# Patient Record
Sex: MALE | Race: WHITE | NOT HISPANIC OR LATINO
[De-identification: names, ages, dates, MRNs, and addresses within clinical notes are randomized per-mention and may not be internally consistent; named-entity substitution may affect disease eponyms.]

---

## 2018-05-08 PROBLEM — Z00.00 ENCOUNTER FOR PREVENTIVE HEALTH EXAMINATION: Status: ACTIVE | Noted: 2018-05-08

## 2018-05-09 ENCOUNTER — APPOINTMENT (OUTPATIENT)
Dept: CARDIOLOGY | Facility: CLINIC | Age: 73
End: 2018-05-09
Payer: MEDICARE

## 2018-05-09 PROCEDURE — A9502: CPT

## 2018-05-09 PROCEDURE — 93015 CV STRESS TEST SUPVJ I&R: CPT

## 2018-05-09 PROCEDURE — 78452 HT MUSCLE IMAGE SPECT MULT: CPT

## 2018-06-01 ENCOUNTER — TRANSCRIPTION ENCOUNTER (OUTPATIENT)
Age: 73
End: 2018-06-01

## 2018-06-01 ENCOUNTER — OUTPATIENT (OUTPATIENT)
Dept: OUTPATIENT SERVICES | Facility: HOSPITAL | Age: 73
LOS: 1 days | End: 2018-06-01
Payer: MEDICARE

## 2018-06-01 VITALS
TEMPERATURE: 98 F | RESPIRATION RATE: 18 BRPM | HEIGHT: 68 IN | WEIGHT: 171.96 LBS | HEART RATE: 57 BPM | SYSTOLIC BLOOD PRESSURE: 152 MMHG | DIASTOLIC BLOOD PRESSURE: 83 MMHG | OXYGEN SATURATION: 99 %

## 2018-06-01 DIAGNOSIS — Z95.1 PRESENCE OF AORTOCORONARY BYPASS GRAFT: Chronic | ICD-10-CM

## 2018-06-01 DIAGNOSIS — R94.39 ABNORMAL RESULT OF OTHER CARDIOVASCULAR FUNCTION STUDY: ICD-10-CM

## 2018-06-01 LAB
ANION GAP SERPL CALC-SCNC: 11 MMOL/L — SIGNIFICANT CHANGE UP (ref 5–17)
BUN SERPL-MCNC: 15 MG/DL — SIGNIFICANT CHANGE UP (ref 7–23)
CALCIUM SERPL-MCNC: 9.4 MG/DL — SIGNIFICANT CHANGE UP (ref 8.4–10.5)
CHLORIDE SERPL-SCNC: 106 MMOL/L — SIGNIFICANT CHANGE UP (ref 96–108)
CO2 SERPL-SCNC: 26 MMOL/L — SIGNIFICANT CHANGE UP (ref 22–31)
CREAT SERPL-MCNC: 0.96 MG/DL — SIGNIFICANT CHANGE UP (ref 0.5–1.3)
GLUCOSE SERPL-MCNC: 105 MG/DL — HIGH (ref 70–99)
HCT VFR BLD CALC: 47.6 % — SIGNIFICANT CHANGE UP (ref 39–50)
HGB BLD-MCNC: 16.1 G/DL — SIGNIFICANT CHANGE UP (ref 13–17)
MCHC RBC-ENTMCNC: 31.4 PG — SIGNIFICANT CHANGE UP (ref 27–34)
MCHC RBC-ENTMCNC: 33.9 GM/DL — SIGNIFICANT CHANGE UP (ref 32–36)
MCV RBC AUTO: 92.5 FL — SIGNIFICANT CHANGE UP (ref 80–100)
PLATELET # BLD AUTO: 150 K/UL — SIGNIFICANT CHANGE UP (ref 150–400)
POTASSIUM SERPL-MCNC: 4 MMOL/L — SIGNIFICANT CHANGE UP (ref 3.5–5.3)
POTASSIUM SERPL-SCNC: 4 MMOL/L — SIGNIFICANT CHANGE UP (ref 3.5–5.3)
RBC # BLD: 5.14 M/UL — SIGNIFICANT CHANGE UP (ref 4.2–5.8)
RBC # FLD: 11.9 % — SIGNIFICANT CHANGE UP (ref 10.3–14.5)
SODIUM SERPL-SCNC: 143 MMOL/L — SIGNIFICANT CHANGE UP (ref 135–145)
WBC # BLD: 5 K/UL — SIGNIFICANT CHANGE UP (ref 3.8–10.5)
WBC # FLD AUTO: 5 K/UL — SIGNIFICANT CHANGE UP (ref 3.8–10.5)

## 2018-06-01 PROCEDURE — 93010 ELECTROCARDIOGRAM REPORT: CPT | Mod: 76

## 2018-06-01 RX ORDER — FOLIC ACID 0.8 MG
1 TABLET ORAL DAILY
Qty: 0 | Refills: 0 | Status: DISCONTINUED | OUTPATIENT
Start: 2018-06-01 | End: 2018-06-02

## 2018-06-01 RX ORDER — FINASTERIDE 5 MG/1
5 TABLET, FILM COATED ORAL DAILY
Qty: 0 | Refills: 0 | Status: DISCONTINUED | OUTPATIENT
Start: 2018-06-01 | End: 2018-06-02

## 2018-06-01 RX ORDER — AMLODIPINE BESYLATE 2.5 MG/1
10 TABLET ORAL DAILY
Qty: 0 | Refills: 0 | Status: DISCONTINUED | OUTPATIENT
Start: 2018-06-01 | End: 2018-06-02

## 2018-06-01 RX ORDER — ASPIRIN/CALCIUM CARB/MAGNESIUM 324 MG
81 TABLET ORAL DAILY
Qty: 0 | Refills: 0 | Status: DISCONTINUED | OUTPATIENT
Start: 2018-06-01 | End: 2018-06-02

## 2018-06-01 RX ORDER — LOSARTAN POTASSIUM 100 MG/1
50 TABLET, FILM COATED ORAL DAILY
Qty: 0 | Refills: 0 | Status: DISCONTINUED | OUTPATIENT
Start: 2018-06-01 | End: 2018-06-02

## 2018-06-01 RX ORDER — CLOPIDOGREL BISULFATE 75 MG/1
75 TABLET, FILM COATED ORAL DAILY
Qty: 0 | Refills: 0 | Status: DISCONTINUED | OUTPATIENT
Start: 2018-06-01 | End: 2018-06-02

## 2018-06-01 RX ORDER — CLOPIDOGREL BISULFATE 75 MG/1
1 TABLET, FILM COATED ORAL
Qty: 30 | Refills: 11
Start: 2018-06-01 | End: 2019-05-26

## 2018-06-01 RX ORDER — ATORVASTATIN CALCIUM 80 MG/1
40 TABLET, FILM COATED ORAL AT BEDTIME
Qty: 0 | Refills: 0 | Status: DISCONTINUED | OUTPATIENT
Start: 2018-06-01 | End: 2018-06-02

## 2018-06-01 RX ORDER — ATENOLOL 25 MG/1
50 TABLET ORAL DAILY
Qty: 0 | Refills: 0 | Status: DISCONTINUED | OUTPATIENT
Start: 2018-06-01 | End: 2018-06-02

## 2018-06-01 RX ADMIN — ATORVASTATIN CALCIUM 40 MILLIGRAM(S): 80 TABLET, FILM COATED ORAL at 21:13

## 2018-06-01 NOTE — H&P CARDIOLOGY - PMH
CAD (coronary artery disease)    HLD (hyperlipidemia)    HTN (hypertension)    Obstructive sleep apnea syndrome

## 2018-06-01 NOTE — DISCHARGE NOTE ADULT - CARE PLAN
Principal Discharge DX:	Coronary artery disease due to lipid rich plaque  Goal:	Pt remains chest pain free and understands post cath discharge instructions  Assessment and plan of treatment:	Do not MISS ANY DOSEs or STOP you Aspirin/Plavix, because these drugs helps to keep your sten open, unless instructed to do so by your cardiologist.   No heavy lifting, strenuous activity, bending, straining, or unnecessary stair climbing for 2 weeks. No driving for 2 days. You may shower 24 hours following the procedure but avoid baths/swimming for 1 week. Check your groin site for bleeding and/or swelling daily following procedure and call your doctor immediately if it occurs or if you experience increased pain at the site. Follow up with your cardiologist in 1-2 weeks. You may call [facility] if you have any questions/concerns regarding your procedure [phone].   Lifestyle modification: include healthy habits to prevent stroke and future CAD  Low salt, low fat diet.   Weight management.   Take medications as prescribed.    No smoking.  Follow up with your cardiologist or primary doctor in 1- 2 weeks.  Secondary Diagnosis:	Mixed hyperlipidemia  Goal:	Your LDL cholesterol will be less than 70mg/dL  Assessment and plan of treatment:	Continue with your cholesterol medications. Eat a heart healthy diet that is low in saturated fats and salt, and includes whole grains, fruits, vegetables and lean protein; exercise regularly (consult with your physician or cardiologist first); maintain a heart healthy weight; if you smoke - quit (A resource to help you stop smoking is the [facility] for [employer] Control – phone number [phone].). Continue to follow with your primary physician or cardiologist.  Secondary Diagnosis:	Essential hypertension  Goal:	Your blood pressure will be controlled.  Assessment and plan of treatment:	Continue with your blood pressure medications; eat a heart healthy diet with low salt diet; exercise regularly (consult with your physician or cardiologist first); maintain a heart healthy weight; if you smoke - quit (A resource to help you stop smoking is the [facility] for Tobacco Control – phone number [phone].); include healthy ways to manage stress. Continue to follow with your primary care physician or cardiologist.

## 2018-06-01 NOTE — DISCHARGE NOTE ADULT - PATIENT PORTAL LINK FT
You can access the [facility] Patient Portal, offered by [facility], by registering with the following website: [email or web address]

## 2018-06-01 NOTE — CHART NOTE - NSCHARTNOTEFT_GEN_A_CORE
Patient underwent a PCI procedure and is being admitted as they are at increased risk for major adverse cardiac and vascular events if discharged due to the following high risk characteristics:      Pre- Procedural Clinical Criteria  -Unstable angina     Admit- patient underwent a PCI procedure and is being admitted due to high risk characteristicts and is considered to be at an increased risk of major adverse cardiovascular events if discharged at this time   -pRCA and dRCA lesion  -bifurcation lesion, long lesion

## 2018-06-01 NOTE — DISCHARGE NOTE ADULT - HOSPITAL COURSE
72 year old  male with pmhx of Quintuple Bypass (1996), HTN, HLD, ALICIA on CPAP, and BPH presents for left heart catheterization. The patient states he performs he normal activities without any shortness of breath or chest pain. The patient states he walks on a treadmill three times a week with no symptoms. The patient went to his cardiologist, Dr. Edwards and underwent a nuclear stress test. The stress test was positive for 'small moderate defect in the inferolateral wall that was moderate in severity'. The patient currently denies chest pain, shortness of breath, dizziness, or orthopnea.    Pt s/p PCI: DAVID x2 to RCA via R groin. Pt tolerated procedure well and overnight remained uneventful. No c/o chest pain or SOB. Pt is hemodynamically stable, EKG and all lab results reviewed. Insertion/incision site benign, no bleeding or hematoma, and cath site dressing changed. Discharge teaching provided to Pt/caretaker and verbalized understanding the instruction. Pt is stable for discharge home as per attending.

## 2018-06-01 NOTE — H&P CARDIOLOGY - HISTORY OF PRESENT ILLNESS
72 year old  male with pmhx of Quintuple Bypass (1996), HTN, HLD, ALICIA on CPAP, and BPH presents for left heart catheterization. The patient states he performs he normal activities without any shortness of breath or chest pain. The patient states he walks on a treadmill three times a week with no symptoms. The patient went to his cardiologist, Dr. Edwards and underwent a nuclear stress test. The stress test was positive for 'small moderate defect in the inferolateral wall that was moderate in severity'. The patient currently denies chest pain, shortness of breath, dizziness, or orthopnea.

## 2018-06-01 NOTE — DISCHARGE NOTE ADULT - PLAN OF CARE
Pt remains chest pain free and understands post cath discharge instructions Do not MISS ANY DOSEs or STOP you Aspirin/Plavix, because these drugs helps to keep your sten open, unless instructed to do so by your cardiologist.   No heavy lifting, strenuous activity, bending, straining, or unnecessary stair climbing for 2 weeks. No driving for 2 days. You may shower 24 hours following the procedure but avoid baths/swimming for 1 week. Check your groin site for bleeding and/or swelling daily following procedure and call your doctor immediately if it occurs or if you experience increased pain at the site. Follow up with your cardiologist in 1-2 weeks. You may call [facility] if you have any questions/concerns regarding your procedure [phone].   Lifestyle modification: include healthy habits to prevent stroke and future CAD  Low salt, low fat diet.   Weight management.   Take medications as prescribed.    No smoking.  Follow up with your cardiologist or primary doctor in 1- 2 weeks. Your LDL cholesterol will be less than 70mg/dL Continue with your cholesterol medications. Eat a heart healthy diet that is low in saturated fats and salt, and includes whole grains, fruits, vegetables and lean protein; exercise regularly (consult with your physician or cardiologist first); maintain a heart healthy weight; if you smoke - quit (A resource to help you stop smoking is the [facility] for Tobacco Control – phone number [phone].). Continue to follow with your primary physician or cardiologist. Your blood pressure will be controlled. Continue with your blood pressure medications; eat a heart healthy diet with low salt diet; exercise regularly (consult with your physician or cardiologist first); maintain a heart healthy weight; if you smoke - quit (A resource to help you stop smoking is the [facility] for Tobacco Control – phone number [phone].); include healthy ways to manage stress. Continue to follow with your primary care physician or cardiologist.

## 2018-06-01 NOTE — DISCHARGE NOTE ADULT - CARE PROVIDER_API CALL
Yan De La Torre), Cardiology; Internal Medicine; Interventional Cardiology  [facility] Dept of Cardiology  [address] [place] [address]  Phone: [phone]  Fax: [phone]

## 2018-06-01 NOTE — DISCHARGE NOTE ADULT - MEDICATION SUMMARY - MEDICATIONS TO TAKE
I will START or STAY ON the medications listed below when I get home from the hospital:    finasteride 5 mg oral tablet  -- 1 tab(s) by mouth once a day  -- Indication: For prostate    aspirin 81 mg oral tablet  -- 1 tab(s) by mouth once a day  -- Indication: For CAD (coronary artery disease)    irbesartan 150 mg oral tablet  -- 1 tab(s) by mouth once a day  -- Indication: For HTN (hypertension)    Rapaflo 8 mg oral capsule  -- 1 cap(s) by mouth once a day  -- Indication: For prostate    rosuvastatin 5 mg oral tablet  -- 1 tab(s) by mouth once a day (at bedtime)  -- Indication: For HLD (hyperlipidemia)    clopidogrel 75 mg oral tablet  -- 1 tab(s) by mouth once a day MDD:1  -- Indication: For CAD (coronary artery disease)    atenolol 50 mg oral tablet  -- 1 tab(s) by mouth once a day  -- Indication: For HTN (hypertension)    amLODIPine 10 mg oral tablet  -- 1 tab(s) by mouth once a day  -- Indication: For HTN (hypertension)    folic acid 1 mg oral tablet  -- 1 tab(s) by mouth once a day  -- Indication: For Supplement

## 2018-06-01 NOTE — CHART NOTE - NSCHARTNOTEFT_GEN_A_CORE
Removal of Femoral Sheath    Pulses in the right/left lower extremity are palpable/audible by doppler/absent.   The patient was placed in the supine position. The insertion site was identified and the sutures were removed per protocol.    The _6___ Chinese femoral sheath was then removed.   Direct pressure was applied for  __20____ minutes.   Complications: None, VSS, Good Hemostasis.     Monitoring of the right/left groin and both lower extremities including neuro-vascular checks and vital signs every 15 minutes x 4, then every 30 minutes x 2, then every 1 hour was ordered.    Discharge Instruction discussed with patient: ASA, Plavix, statin, diet, activities, access site care, follow up care, reportable signs and symptoms.     A/P   72 year old  male with pmhx of Quintuple Bypass (1996), HTN, HLD, ALICIA on CPAP, and BPH presents for left heart catheterization. The patient states he performs he normal activities without any shortness of breath or chest pain. The patient states he walks on a treadmill three times a week with no symptoms. The patient went to his cardiologist, Dr. Edwards and underwent a nuclear stress test. The stress test was positive for 'small moderate defect in the inferolateral wall that was moderate in severity'. The patient currently denies chest pain, shortness of breath, dizziness, or orthopnea.     S/P PCI of pRCA(95%) X 1 DAVID and dist RCA (95%) X 1 DAVID    Plan: continue to monitor, Continue ASA, Plavix, Statin,   discharge home in am if stable.  Pt will follow up with his/her cardiologist in 1-2weeks.

## 2018-06-02 VITALS — OXYGEN SATURATION: 98 % | HEART RATE: 62 BPM

## 2018-06-02 LAB
ANION GAP SERPL CALC-SCNC: 11 MMOL/L — SIGNIFICANT CHANGE UP (ref 5–17)
BUN SERPL-MCNC: 19 MG/DL — SIGNIFICANT CHANGE UP (ref 7–23)
CALCIUM SERPL-MCNC: 8.8 MG/DL — SIGNIFICANT CHANGE UP (ref 8.4–10.5)
CHLORIDE SERPL-SCNC: 102 MMOL/L — SIGNIFICANT CHANGE UP (ref 96–108)
CO2 SERPL-SCNC: 26 MMOL/L — SIGNIFICANT CHANGE UP (ref 22–31)
CREAT SERPL-MCNC: 1.04 MG/DL — SIGNIFICANT CHANGE UP (ref 0.5–1.3)
GLUCOSE SERPL-MCNC: 97 MG/DL — SIGNIFICANT CHANGE UP (ref 70–99)
HCT VFR BLD CALC: 44.6 % — SIGNIFICANT CHANGE UP (ref 39–50)
HGB BLD-MCNC: 15.2 G/DL — SIGNIFICANT CHANGE UP (ref 13–17)
MCHC RBC-ENTMCNC: 31.6 PG — SIGNIFICANT CHANGE UP (ref 27–34)
MCHC RBC-ENTMCNC: 34.1 GM/DL — SIGNIFICANT CHANGE UP (ref 32–36)
MCV RBC AUTO: 92.7 FL — SIGNIFICANT CHANGE UP (ref 80–100)
PLATELET # BLD AUTO: 158 K/UL — SIGNIFICANT CHANGE UP (ref 150–400)
POTASSIUM SERPL-MCNC: 3.5 MMOL/L — SIGNIFICANT CHANGE UP (ref 3.5–5.3)
POTASSIUM SERPL-SCNC: 3.5 MMOL/L — SIGNIFICANT CHANGE UP (ref 3.5–5.3)
RBC # BLD: 4.81 M/UL — SIGNIFICANT CHANGE UP (ref 4.2–5.8)
RBC # FLD: 12 % — SIGNIFICANT CHANGE UP (ref 10.3–14.5)
SODIUM SERPL-SCNC: 139 MMOL/L — SIGNIFICANT CHANGE UP (ref 135–145)
WBC # BLD: 6.4 K/UL — SIGNIFICANT CHANGE UP (ref 3.8–10.5)
WBC # FLD AUTO: 6.4 K/UL — SIGNIFICANT CHANGE UP (ref 3.8–10.5)

## 2018-06-02 PROCEDURE — C1887: CPT

## 2018-06-02 PROCEDURE — 99153 MOD SED SAME PHYS/QHP EA: CPT

## 2018-06-02 PROCEDURE — 85027 COMPLETE CBC AUTOMATED: CPT

## 2018-06-02 PROCEDURE — 80048 BASIC METABOLIC PNL TOTAL CA: CPT

## 2018-06-02 PROCEDURE — C9600: CPT | Mod: RC

## 2018-06-02 PROCEDURE — 94660 CPAP INITIATION&MGMT: CPT

## 2018-06-02 PROCEDURE — 99152 MOD SED SAME PHYS/QHP 5/>YRS: CPT

## 2018-06-02 PROCEDURE — C1874: CPT

## 2018-06-02 PROCEDURE — 93454 CORONARY ARTERY ANGIO S&I: CPT | Mod: 59

## 2018-06-02 PROCEDURE — C1725: CPT

## 2018-06-02 PROCEDURE — C1894: CPT

## 2018-06-02 PROCEDURE — 93005 ELECTROCARDIOGRAM TRACING: CPT

## 2018-06-02 PROCEDURE — C1769: CPT

## 2018-06-02 RX ORDER — POTASSIUM CHLORIDE 20 MEQ
40 PACKET (EA) ORAL ONCE
Qty: 0 | Refills: 0 | Status: COMPLETED | OUTPATIENT
Start: 2018-06-02 | End: 2018-06-02

## 2018-06-02 RX ADMIN — Medication 40 MILLIEQUIVALENT(S): at 05:16

## 2018-06-02 RX ADMIN — CLOPIDOGREL BISULFATE 75 MILLIGRAM(S): 75 TABLET, FILM COATED ORAL at 05:16

## 2018-06-02 RX ADMIN — ATENOLOL 50 MILLIGRAM(S): 25 TABLET ORAL at 05:16

## 2018-06-02 RX ADMIN — AMLODIPINE BESYLATE 10 MILLIGRAM(S): 2.5 TABLET ORAL at 05:16

## 2018-06-02 RX ADMIN — LOSARTAN POTASSIUM 50 MILLIGRAM(S): 100 TABLET, FILM COATED ORAL at 05:16

## 2018-06-02 RX ADMIN — Medication 81 MILLIGRAM(S): at 05:16

## 2018-06-02 NOTE — PROGRESS NOTE ADULT - SUBJECTIVE AND OBJECTIVE BOX
Subjective/Objective:  Patient is a 72y old  Male who presents with a chief complaint of cardiac cath (2018 20:45)    Allergies    No Known Allergies    Intolerances      Medications:  amLODIPine   Tablet 10 milliGRAM(s) Oral daily  aspirin enteric coated 81 milliGRAM(s) Oral daily  ATENolol  Tablet 50 milliGRAM(s) Oral daily  atorvastatin 40 milliGRAM(s) Oral at bedtime  clopidogrel Tablet 75 milliGRAM(s) Oral daily  finasteride 5 milliGRAM(s) Oral daily  folic acid 1 milliGRAM(s) Oral daily  losartan 50 milliGRAM(s) Oral daily  potassium chloride    Tablet ER 40 milliEquivalent(s) Oral once      Review of Systems:   No c/o chest pain or SOB, and all others negative.    Vitals:  T(C): 36.6 (18 @ 20:00), Max: 36.8 (18 @ 11:27)  HR: 60 (18 @ 23:22) (57 - 72)  BP: 115/65 (18 @ 21:00) (114/65 - 152/83)  BP(mean): 106 (18 @ 11:27) (106 - 106)  RR: 17 (18 @ 21:00) (17 - 18)  SpO2: 97% (18 @ 23:22) (96% - 99%)  Wt(kg): --  Daily Height in cm: 172.72 (2018 11:27)    Daily Weight in k (2018 11:27)  I&O's Summary    2018 07:01  -  2018 04:04  --------------------------------------------------------  IN: 240 mL / OUT: 0 mL / NET: 240 mL      Physical Exam:  Appearance: Pt in NAD, non-toxic  Cardiovascular: S1 S2  Cath Site: No evidence of bleeding or hematoma, Non-tender to palpation, 2+ distal pulses  Respiratory: Clear to auscultation bilaterally  Gastrointestinal: Soft, NT/ND, Bowel Sounds +  Neurologic: Non-focal                          15.2   6.4   )-----------( 158      ( 2018 23:49 )             44.6     06-    139  |  102  |  19  ----------------------------<  97  3.5   |  26  |  1.04    Ca    8.8      2018 23:49        Lipid panel   Hgb A1c         Interpretation of Telemetry: SB/SR at HR 50-70's.  No special event over night.    Assessment/Plan:   S/P PCI: DAVID x2 to RCA via R groin. Pt tolerated procedure well and overnight remained uneventful. No c/o chest pain or SOB. Pt is hemodynamically stable, EKG and all lab results reviewed. Noted low K this morning and KCL supplemented. Insertion/incision site benign, no bleeding or hematoma, and cath site dressing changed. Discharge teaching provided to Pt/caretaker and verbalized understanding the instruction. Pt is stable for discharge home as per attending.   F/U with cardiologist in 1-2 weeks.  Plan to d/c home in Am if stable.

## 2019-05-23 PROBLEM — E78.5 HYPERLIPIDEMIA, UNSPECIFIED: Chronic | Status: ACTIVE | Noted: 2018-06-01

## 2019-05-23 PROBLEM — I10 ESSENTIAL (PRIMARY) HYPERTENSION: Chronic | Status: ACTIVE | Noted: 2018-06-01

## 2019-05-23 PROBLEM — G47.33 OBSTRUCTIVE SLEEP APNEA (ADULT) (PEDIATRIC): Chronic | Status: ACTIVE | Noted: 2018-06-01

## 2019-05-23 PROBLEM — I25.10 ATHEROSCLEROTIC HEART DISEASE OF NATIVE CORONARY ARTERY WITHOUT ANGINA PECTORIS: Chronic | Status: ACTIVE | Noted: 2018-06-01

## 2019-06-05 DIAGNOSIS — Z82.49 FAMILY HISTORY OF ISCHEMIC HEART DISEASE AND OTHER DISEASES OF THE CIRCULATORY SYSTEM: ICD-10-CM

## 2019-06-05 DIAGNOSIS — Z78.9 OTHER SPECIFIED HEALTH STATUS: ICD-10-CM

## 2019-06-05 RX ORDER — FOLIC ACID 1 MG/1
1 TABLET ORAL DAILY
Refills: 0 | Status: ACTIVE | COMMUNITY

## 2019-06-05 RX ORDER — FINASTERIDE 5 MG/1
5 TABLET, FILM COATED ORAL DAILY
Refills: 0 | Status: ACTIVE | COMMUNITY

## 2019-06-11 ENCOUNTER — APPOINTMENT (OUTPATIENT)
Dept: CARDIOLOGY | Facility: CLINIC | Age: 74
End: 2019-06-11
Payer: MEDICARE

## 2019-06-11 PROCEDURE — 78452 HT MUSCLE IMAGE SPECT MULT: CPT

## 2019-06-11 PROCEDURE — A9502: CPT

## 2019-06-11 PROCEDURE — 93015 CV STRESS TEST SUPVJ I&R: CPT

## 2020-12-23 ENCOUNTER — APPOINTMENT (OUTPATIENT)
Dept: CARDIOLOGY | Facility: CLINIC | Age: 75
End: 2020-12-23
Payer: MEDICARE

## 2020-12-23 PROCEDURE — 78452 HT MUSCLE IMAGE SPECT MULT: CPT

## 2020-12-23 PROCEDURE — A9502: CPT

## 2020-12-23 PROCEDURE — 93015 CV STRESS TEST SUPVJ I&R: CPT

## 2022-04-08 ENCOUNTER — APPOINTMENT (OUTPATIENT)
Dept: CARDIOLOGY | Facility: CLINIC | Age: 77
End: 2022-04-08
Payer: MEDICARE

## 2022-04-08 PROCEDURE — A9502: CPT

## 2022-04-08 PROCEDURE — 78452 HT MUSCLE IMAGE SPECT MULT: CPT

## 2022-04-08 PROCEDURE — 93015 CV STRESS TEST SUPVJ I&R: CPT

## 2022-04-22 ENCOUNTER — TRANSCRIPTION ENCOUNTER (OUTPATIENT)
Age: 77
End: 2022-04-22

## 2022-05-01 ENCOUNTER — RESULT CHARGE (OUTPATIENT)
Age: 77
End: 2022-05-01

## 2022-05-03 ENCOUNTER — APPOINTMENT (OUTPATIENT)
Dept: CARDIOLOGY | Facility: CLINIC | Age: 77
End: 2022-05-03
Payer: MEDICARE

## 2022-05-03 VITALS
BODY MASS INDEX: 27.15 KG/M2 | DIASTOLIC BLOOD PRESSURE: 69 MMHG | HEART RATE: 65 BPM | WEIGHT: 173 LBS | OXYGEN SATURATION: 96 % | HEIGHT: 67 IN | SYSTOLIC BLOOD PRESSURE: 137 MMHG

## 2022-05-03 PROCEDURE — 93000 ELECTROCARDIOGRAM COMPLETE: CPT

## 2022-05-03 PROCEDURE — 99205 OFFICE O/P NEW HI 60 MIN: CPT

## 2022-05-03 RX ORDER — IRBESARTAN 150 MG/1
150 TABLET ORAL DAILY
Refills: 0 | Status: COMPLETED | COMMUNITY
End: 2022-05-03

## 2022-05-03 NOTE — DISCUSSION/SUMMARY
[FreeTextEntry1] : \par Plan:\par 1. patient scheduled for left heart catheterization + angiogram - agree with r/a/c/i\par 2. continue current medications\par 3. will review and follow-up post-procedure

## 2022-05-03 NOTE — END OF VISIT
bandage. ¨ Apply more petroleum jelly and replace the bandage as needed. · Prop up the sore area on a pillow anytime you sit or lie down during the next 3 days. Try to keep it above the level of your heart. This will help reduce swelling. · Avoid any activity that could cause your cut to reopen. · Do not remove the stitches on your own. Your doctor will tell you when to come back to have the stitches removed. · Leave Steri-Strips on until they fall off. · Be safe with medicines. Read and follow all instructions on the label. ¨ If the doctor gave you a prescription medicine for pain, take it as prescribed. ¨ If you are not taking a prescription pain medicine, ask your doctor if you can take an over-the-counter medicine. When should you call for help? Call your doctor now or seek immediate medical care if:    · You have new pain, or your pain gets worse.     · The skin near the cut is cold or pale or changes color.     · You have tingling, weakness, or numbness near the cut.     · The cut starts to bleed, and blood soaks through the bandage. Oozing small amounts of blood is normal.     · You have trouble moving the area near the cut.     · You have symptoms of infection, such as:  ¨ Increased pain, swelling, warmth, or redness around the cut. ¨ Red streaks leading from the cut. ¨ Pus draining from the cut. ¨ A fever.    Watch closely for changes in your health, and be sure to contact your doctor if:    · The cut reopens.     · You do not get better as expected. Where can you learn more? Go to [email or web address] and sign in to your [employer] account. Enter R217 in the [facility] box to learn more about \"Cuts Closed With Stitches: Care Instructions. \"     If you do not have an account, please click on the \"Sign Up Now\" link. Current as of: November 20, 2017  Content Version: 11.7  © 0606-7896 [employer].  Care instructions adapted under license by [facility]
[Time Spent: ___ minutes] : I have spent [unfilled] minutes of time on the encounter.

## 2022-05-03 NOTE — HISTORY OF PRESENT ILLNESS
[FreeTextEntry1] : Mr Shi is a 76 year old man who presents for review of abnormal stress test. PMHx notable for previous CABG (LIMA-LAD, SVG-D1, SVG-RPDA). Cath 2018 revealed occluded SVG-RPDA and PCI was performed to native RCA (mid [place] 2.5, prox [place] 3.0). Recently reports CCS II symptoms with stress nuclear 4/8/22 reporting inferolateral ischemia. Symptomatic despite BB + CCB. Discussed conservative vs invasive management. Agreed should proceed with invasive angiogram.

## 2022-05-08 ENCOUNTER — NON-APPOINTMENT (OUTPATIENT)
Age: 77
End: 2022-05-08

## 2022-05-12 ENCOUNTER — TRANSCRIPTION ENCOUNTER (OUTPATIENT)
Age: 77
End: 2022-05-12

## 2022-05-12 ENCOUNTER — INPATIENT (INPATIENT)
Facility: HOSPITAL | Age: 77
LOS: 0 days | Discharge: HOME | DRG: 251 | End: 2022-05-13
Attending: INTERNAL MEDICINE | Admitting: INTERNAL MEDICINE
Payer: COMMERCIAL

## 2022-05-12 VITALS
HEART RATE: 68 BPM | SYSTOLIC BLOOD PRESSURE: 137 MMHG | WEIGHT: 171.96 LBS | DIASTOLIC BLOOD PRESSURE: 72 MMHG | OXYGEN SATURATION: 98 % | TEMPERATURE: 98 F | RESPIRATION RATE: 16 BRPM | HEIGHT: 67 IN

## 2022-05-12 DIAGNOSIS — Z95.1 PRESENCE OF AORTOCORONARY BYPASS GRAFT: Chronic | ICD-10-CM

## 2022-05-12 DIAGNOSIS — I25.9 CHRONIC ISCHEMIC HEART DISEASE, UNSPECIFIED: ICD-10-CM

## 2022-05-12 LAB
ALBUMIN SERPL ELPH-MCNC: 4.6 G/DL — SIGNIFICANT CHANGE UP (ref 3.3–5)
ALP SERPL-CCNC: 61 U/L — SIGNIFICANT CHANGE UP (ref 40–120)
ALT FLD-CCNC: 24 U/L — SIGNIFICANT CHANGE UP (ref 10–45)
ANION GAP SERPL CALC-SCNC: 10 MMOL/L — SIGNIFICANT CHANGE UP (ref 5–17)
AST SERPL-CCNC: 24 U/L — SIGNIFICANT CHANGE UP (ref 10–40)
BILIRUB SERPL-MCNC: 0.7 MG/DL — SIGNIFICANT CHANGE UP (ref 0.2–1.2)
BUN SERPL-MCNC: 14 MG/DL — SIGNIFICANT CHANGE UP (ref 7–23)
CALCIUM SERPL-MCNC: 9.1 MG/DL — SIGNIFICANT CHANGE UP (ref 8.4–10.5)
CHLORIDE SERPL-SCNC: 105 MMOL/L — SIGNIFICANT CHANGE UP (ref 96–108)
CO2 SERPL-SCNC: 27 MMOL/L — SIGNIFICANT CHANGE UP (ref 22–31)
CREAT SERPL-MCNC: 0.94 MG/DL — SIGNIFICANT CHANGE UP (ref 0.5–1.3)
EGFR: 83 ML/MIN/1.73M2 — SIGNIFICANT CHANGE UP
GLUCOSE SERPL-MCNC: 108 MG/DL — HIGH (ref 70–99)
HCT VFR BLD CALC: 43.4 % — SIGNIFICANT CHANGE UP (ref 39–50)
HGB BLD-MCNC: 14.8 G/DL — SIGNIFICANT CHANGE UP (ref 13–17)
MCHC RBC-ENTMCNC: 31.4 PG — SIGNIFICANT CHANGE UP (ref 27–34)
MCHC RBC-ENTMCNC: 34.1 GM/DL — SIGNIFICANT CHANGE UP (ref 32–36)
MCV RBC AUTO: 91.9 FL — SIGNIFICANT CHANGE UP (ref 80–100)
NRBC # BLD: 0 /100 WBCS — SIGNIFICANT CHANGE UP (ref 0–0)
PLATELET # BLD AUTO: 164 K/UL — SIGNIFICANT CHANGE UP (ref 150–400)
POTASSIUM SERPL-MCNC: 3.9 MMOL/L — SIGNIFICANT CHANGE UP (ref 3.5–5.3)
POTASSIUM SERPL-SCNC: 3.9 MMOL/L — SIGNIFICANT CHANGE UP (ref 3.5–5.3)
PROT SERPL-MCNC: 7.1 G/DL — SIGNIFICANT CHANGE UP (ref 6–8.3)
RBC # BLD: 4.72 M/UL — SIGNIFICANT CHANGE UP (ref 4.2–5.8)
RBC # FLD: 12.4 % — SIGNIFICANT CHANGE UP (ref 10.3–14.5)
SODIUM SERPL-SCNC: 142 MMOL/L — SIGNIFICANT CHANGE UP (ref 135–145)
TROPONIN T, HIGH SENSITIVITY RESULT: 9 NG/L — SIGNIFICANT CHANGE UP (ref 0–51)
WBC # BLD: 4.6 K/UL — SIGNIFICANT CHANGE UP (ref 3.8–10.5)
WBC # FLD AUTO: 4.6 K/UL — SIGNIFICANT CHANGE UP (ref 3.8–10.5)

## 2022-05-12 PROCEDURE — 93010 ELECTROCARDIOGRAM REPORT: CPT | Mod: 77

## 2022-05-12 PROCEDURE — 92920 PRQ TRLUML C ANGIOP 1ART&/BR: CPT | Mod: RC

## 2022-05-12 PROCEDURE — 93455 CORONARY ART/GRFT ANGIO S&I: CPT | Mod: 26,59

## 2022-05-12 PROCEDURE — 93010 ELECTROCARDIOGRAM REPORT: CPT

## 2022-05-12 PROCEDURE — 92978 ENDOLUMINL IVUS OCT C 1ST: CPT | Mod: 26,RC

## 2022-05-12 PROCEDURE — 99152 MOD SED SAME PHYS/QHP 5/>YRS: CPT

## 2022-05-12 RX ORDER — AMLODIPINE BESYLATE 2.5 MG/1
1 TABLET ORAL
Qty: 0 | Refills: 0 | DISCHARGE

## 2022-05-12 RX ORDER — ATORVASTATIN CALCIUM 80 MG/1
20 TABLET, FILM COATED ORAL AT BEDTIME
Refills: 0 | Status: DISCONTINUED | OUTPATIENT
Start: 2022-05-12 | End: 2022-05-13

## 2022-05-12 RX ORDER — ROSUVASTATIN CALCIUM 5 MG/1
1 TABLET ORAL
Qty: 0 | Refills: 0 | DISCHARGE

## 2022-05-12 RX ORDER — SILODOSIN 4 MG/1
1 CAPSULE ORAL
Qty: 0 | Refills: 0 | DISCHARGE

## 2022-05-12 RX ORDER — ASPIRIN/CALCIUM CARB/MAGNESIUM 324 MG
1 TABLET ORAL
Qty: 0 | Refills: 0 | DISCHARGE

## 2022-05-12 RX ORDER — FINASTERIDE 5 MG/1
1 TABLET, FILM COATED ORAL
Qty: 0 | Refills: 0 | DISCHARGE

## 2022-05-12 RX ORDER — ASPIRIN/CALCIUM CARB/MAGNESIUM 324 MG
81 TABLET ORAL DAILY
Refills: 0 | Status: DISCONTINUED | OUTPATIENT
Start: 2022-05-13 | End: 2022-05-13

## 2022-05-12 RX ORDER — CLOPIDOGREL BISULFATE 75 MG/1
1 TABLET, FILM COATED ORAL
Qty: 90 | Refills: 3
Start: 2022-05-12 | End: 2023-05-06

## 2022-05-12 RX ORDER — FINASTERIDE 5 MG/1
5 TABLET, FILM COATED ORAL DAILY
Refills: 0 | Status: DISCONTINUED | OUTPATIENT
Start: 2022-05-12 | End: 2022-05-13

## 2022-05-12 RX ORDER — ATENOLOL 25 MG/1
50 TABLET ORAL DAILY
Refills: 0 | Status: DISCONTINUED | OUTPATIENT
Start: 2022-05-12 | End: 2022-05-13

## 2022-05-12 RX ORDER — FOLIC ACID 0.8 MG
1 TABLET ORAL
Qty: 0 | Refills: 0 | DISCHARGE

## 2022-05-12 RX ORDER — CLOPIDOGREL BISULFATE 75 MG/1
75 TABLET, FILM COATED ORAL DAILY
Refills: 0 | Status: DISCONTINUED | OUTPATIENT
Start: 2022-05-13 | End: 2022-05-13

## 2022-05-12 RX ORDER — SODIUM CHLORIDE 9 MG/ML
1000 INJECTION INTRAMUSCULAR; INTRAVENOUS; SUBCUTANEOUS
Refills: 0 | Status: DISCONTINUED | OUTPATIENT
Start: 2022-05-12 | End: 2022-05-13

## 2022-05-12 RX ORDER — ATENOLOL 25 MG/1
1 TABLET ORAL
Qty: 0 | Refills: 0 | DISCHARGE

## 2022-05-12 RX ORDER — IRBESARTAN 75 MG/1
1 TABLET ORAL
Qty: 0 | Refills: 0 | DISCHARGE

## 2022-05-12 RX ORDER — ASPIRIN/CALCIUM CARB/MAGNESIUM 324 MG
1 TABLET ORAL
Qty: 90 | Refills: 3
Start: 2022-05-12 | End: 2023-05-06

## 2022-05-12 RX ORDER — AMLODIPINE BESYLATE 2.5 MG/1
10 TABLET ORAL DAILY
Refills: 0 | Status: DISCONTINUED | OUTPATIENT
Start: 2022-05-12 | End: 2022-05-13

## 2022-05-12 RX ADMIN — SODIUM CHLORIDE 150 MILLILITER(S): 9 INJECTION INTRAMUSCULAR; INTRAVENOUS; SUBCUTANEOUS at 12:46

## 2022-05-12 RX ADMIN — SODIUM CHLORIDE 75 MILLILITER(S): 9 INJECTION INTRAMUSCULAR; INTRAVENOUS; SUBCUTANEOUS at 09:36

## 2022-05-12 RX ADMIN — ATORVASTATIN CALCIUM 20 MILLIGRAM(S): 80 TABLET, FILM COATED ORAL at 21:24

## 2022-05-12 NOTE — ASU DISCHARGE PLAN (ADULT/PEDIATRIC) - C. MAKE IMPORTANT PERSONAL OR BUSINESS DECISIONS
Patient comes to clinic for follow up anticoagulation visit.  Last INR on 5/13/21 was 3.2.  Dose maintained.   Today's INR is 3.4 and is above goal range.    Current warfarin total weekly dose of 65 mg verified.  Informed the INR result is above therapeutic range and instructed to decrease current dose per protocol. Discussed dose and return date of 6/10/21 for next INR. See Anticoagulation flowsheet.    INR today is 3.4, above range and up from previous 3.2, in 2 weeks on dose of 70 mg/week. Prior stable dose was 80mg/wk. Denies changes in meds/diet/health, feels fine. Patient would like to eat some salads. Instructed pt to decrease dose to 65 mg/week.  Recheck INR in 2 week at [facility].    Chiquita Benjamin NP is in the office today supervising the treatment.    Call your physician or seek medical care immediately if you notice any of the following symptoms of a bleed:   · Red, dark, coffee or cola colored urine  · Red or tar like stools  · Excessive bleeding from gums or nose  · Vomiting coffee colored or bright red material  · Coughing up red tinged sputum  · Severe or unprovoked pain (ex: severe Headache or Abdominal pain)  · Sudden, spontaneous bruising for no reason  · For female patients:  Excessive menstrual bleeding  · A cut that will not stop bleeding within 10-15 mins  · Symptoms associated with abnormal bleeding/high INR reviewed.    Encouraged to avoid activities that may result in a serious fall or injury and verbalizes understanding.    Instructed to contact the clinic with any unusual bleeding or bruising, any changes in medications, diet, health status, lifestyle, or any other changes, questions or concerns. Verbalized understanding of all discussed.   
Statement Selected

## 2022-05-12 NOTE — H&P CARDIOLOGY - NSICDXPASTMEDICALHX_GEN_ALL_CORE_FT
PAST MEDICAL HISTORY:  CAD (coronary artery disease)     History of BPH     HLD (hyperlipidemia)     HTN (hypertension)     Obstructive sleep apnea syndrome

## 2022-05-12 NOTE — ASU DISCHARGE PLAN (ADULT/PEDIATRIC) - CARE PROVIDER_API CALL
Yan De La Torre)  Cardiology; Internal Medicine; Interventional Cardiology  [facility]- Dept of Cardiology, [address] [place] [address]  Phone: [phone]  Fax: [phone]  Established Patient  Follow Up Time: 2 weeks

## 2022-05-12 NOTE — ASU DISCHARGE PLAN (ADULT/PEDIATRIC) - NS MD DC FALL RISK RISK
For information on Fall & Injury Prevention, visit: [email or web address] OR  [email or web address] OR  https://www.cdc.gov/steadi/patient.html

## 2022-05-12 NOTE — H&P CARDIOLOGY - HISTORY OF PRESENT ILLNESS
This is a 76 year old  male with PMH of CAD, CABG 5v (1996 LIMA-LAD, SVG-D1, SVG-RPDA), with stent placement to native prox/mid RCA 2018 for occluded SVG-RPDA, HTN, HLD, ALICIA on CPAP7, and BPH who presented to cardiology, Dr. De La Torre, for evaluation of abnormal stress test 4/8/22 (reporting inferolateral ischemia) with recent reports occasional episodes of chest discomfort (CCS II symptoms).  Denies SOB/CHRISTIAN, dizziness, diaphoresis, palpitations, nausea, vomiting, peripheral edema, recent weight gain, or syncope. Pt. presents for further evaluation and cardiac cath. No implanted monitoring devices.

## 2022-05-12 NOTE — ASU DISCHARGE PLAN (ADULT/PEDIATRIC) - ASU DC SPECIAL INSTRUCTIONSFT

## 2022-05-13 ENCOUNTER — TRANSCRIPTION ENCOUNTER (OUTPATIENT)
Age: 77
End: 2022-05-13

## 2022-05-13 VITALS
SYSTOLIC BLOOD PRESSURE: 105 MMHG | HEART RATE: 79 BPM | DIASTOLIC BLOOD PRESSURE: 55 MMHG | OXYGEN SATURATION: 94 % | TEMPERATURE: 98 F | RESPIRATION RATE: 17 BRPM

## 2022-05-13 PROCEDURE — C1753: CPT

## 2022-05-13 PROCEDURE — 84484 ASSAY OF TROPONIN QUANT: CPT

## 2022-05-13 PROCEDURE — C1761: CPT

## 2022-05-13 PROCEDURE — C1769: CPT

## 2022-05-13 PROCEDURE — 99152 MOD SED SAME PHYS/QHP 5/>YRS: CPT

## 2022-05-13 PROCEDURE — C1894: CPT

## 2022-05-13 PROCEDURE — 93005 ELECTROCARDIOGRAM TRACING: CPT

## 2022-05-13 PROCEDURE — 99153 MOD SED SAME PHYS/QHP EA: CPT

## 2022-05-13 PROCEDURE — C1725: CPT

## 2022-05-13 PROCEDURE — 93455 CORONARY ART/GRFT ANGIO S&I: CPT | Mod: 59

## 2022-05-13 PROCEDURE — 85027 COMPLETE CBC AUTOMATED: CPT

## 2022-05-13 PROCEDURE — 92978 ENDOLUMINL IVUS OCT C 1ST: CPT

## 2022-05-13 PROCEDURE — 92920 PRQ TRLUML C ANGIOP 1ART&/BR: CPT

## 2022-05-13 PROCEDURE — C1887: CPT

## 2022-05-13 PROCEDURE — 80053 COMPREHEN METABOLIC PANEL: CPT

## 2022-05-13 PROCEDURE — 93799 UNLISTED CV SVC/PROCEDURE: CPT

## 2022-05-13 PROCEDURE — 93454 CORONARY ARTERY ANGIO S&I: CPT

## 2022-05-13 RX ADMIN — CLOPIDOGREL BISULFATE 75 MILLIGRAM(S): 75 TABLET, FILM COATED ORAL at 06:48

## 2022-05-13 RX ADMIN — Medication 81 MILLIGRAM(S): at 06:48

## 2022-05-13 RX ADMIN — ATENOLOL 50 MILLIGRAM(S): 25 TABLET ORAL at 06:49

## 2022-05-13 RX ADMIN — AMLODIPINE BESYLATE 10 MILLIGRAM(S): 2.5 TABLET ORAL at 06:48

## 2022-05-13 NOTE — DISCHARGE NOTE PROVIDER - HOSPITAL COURSE
HPI:  This is a 76 year old  male with PMH of CAD, CABG 5v (1996 LIMA-LAD, SVG-D1, SVG-RPDA), with stent placement to native prox/mid RCA 2018 for occluded SVG-RPDA, HTN, HLD, ALICIA on CPAP7, and BPH who presented to cardiology, Dr. De La Torre, for evaluation of abnormal stress test 4/8/22 (reporting inferolateral ischemia) with recent reports occasional episodes of chest discomfort (CCS II symptoms).  Denies SOB/CHRISTIAN, dizziness, diaphoresis, palpitations, nausea, vomiting, peripheral edema, recent weight gain, or syncope. Pt. presents for further evaluation and cardiac cath. No implanted monitoring devices.  (12 May 2022 08:11).    5/12 s/p shock waves to RCA. Right femoral access site without swelling, bleeding.

## 2022-05-13 NOTE — DISCHARGE NOTE PROVIDER - NSDCCPCAREPLAN_GEN_ALL_CORE_FT
PRINCIPAL DISCHARGE DIAGNOSIS  Diagnosis: CAD (coronary artery disease)  Assessment and Plan of Treatment: Do not stop your aspirin or Plavix unless instructed to do so by your cardiologist, they help keep your stented arteries open.   No heavy lifting, strenuous activity, bending, straining, or unnecessary stair climbing for 2 weeks. No driving for 2 days. You may shower 24 hours following the procedure but avoid baths/swimming for 1 week. Check your groin site for bleeding and/or swelling daily following procedure and call your doctor immediately if it occurs or if you experience increased pain at the site. Follow up with your cardiologist in 1-2 weeks. You may call [facility] if you have any questions/concerns regarding your procedure [phone].      SECONDARY DISCHARGE DIAGNOSES  Diagnosis: HTN (hypertension)  Assessment and Plan of Treatment: Continue with your blood pressure medications; eat a heart healthy diet with low salt diet; exercise regularly (consult with your physician or cardiologist first); maintain a heart healthy weight; if you smoke - quit (A resource to help you stop smoking is the [facility] [employer] Control – phone number [phone].); include healthy ways to manage stress. Continue to follow with your primary care physician or cardiologist.    Diagnosis: HLD (hyperlipidemia)  Assessment and Plan of Treatment: Continue with your cholesterol medications. Eat a heart healthy diet that is low in saturated fats and salt, and includes whole grains, fruits, vegetables and lean protein; exercise regularly (consult with your physician or cardiologist first); maintain a heart healthy weight; if you smoke - quit (A resource to help you stop smoking is the [facility] [employer] for Tobacco Control – phone number [phone].). Continue to follow with your primary physician or cardiologist.

## 2022-05-13 NOTE — DISCHARGE NOTE PROVIDER - NSDCCPTREATMENT_GEN_ALL_CORE_FT
PRINCIPAL PROCEDURE  Procedure: Lithotripsy, coronary artery  Findings and Treatment: shock wave to RCA

## 2022-05-13 NOTE — DISCHARGE NOTE PROVIDER - CARE PROVIDER_API CALL
Yan De La Torre)  Cardiology; Internal Medicine; Interventional Cardiology  [facility]- Dept of Cardiology, [address] [place] [address]  Phone: [phone]  Fax: [phone]  Follow Up Time: 2 weeks

## 2022-05-13 NOTE — DISCHARGE NOTE NURSING/CASE MANAGEMENT/SOCIAL WORK - PATIENT PORTAL LINK FT
You can access the FollowMyHealth Patient Portal offered by [facility] by registering at the following website: [email or web address]. By joining [employer]’s FollowMyHealth portal, you will also be able to view your health information using other applications (apps) compatible with our system.

## 2022-05-13 NOTE — DISCHARGE NOTE NURSING/CASE MANAGEMENT/SOCIAL WORK - NSDCPEFALRISK_GEN_ALL_CORE
For information on Fall & Injury Prevention, visit: [email or web address] OR  [email or web address] OR  https://www.cdc.gov/steadi/patient.html

## 2022-05-13 NOTE — DISCHARGE NOTE PROVIDER - NSDCPNSUBOBJ_GEN_ALL_CORE
Patient is a 76y old  Male who presents with a chief complaint of         Allergies    No Known Allergies    Intolerances        Medications:  amLODIPine   Tablet 10 milliGRAM(s) Oral daily  aspirin enteric coated 81 milliGRAM(s) Oral daily  ATENolol  Tablet 50 milliGRAM(s) Oral daily  atorvastatin 20 milliGRAM(s) Oral at bedtime  clopidogrel Tablet 75 milliGRAM(s) Oral daily  finasteride 5 milliGRAM(s) Oral daily  sodium chloride 0.9%. 1000 milliLiter(s) IV Continuous <Continuous>  sodium chloride 0.9%. 1000 milliLiter(s) IV Continuous <Continuous>      Vitals:  T(C): 36.4 (22 @ 11:35), Max: 36.5 (22 @ 07:43)  HR: 79 (22 @ 19:00) (65 - 85)  BP: 110/60 (22 @ 18:40) (93/59 - 137/72)  BP(mean): 72 (22 @ 18:40) (63 - 93)  RR: 17 (22 @ 18:40) (16 - 18)  SpO2: 98% (22 @ 18:40) (93% - 98%)  Wt(kg): --  Daily Height in cm: 170.18 (12 May 2022 08:11)    Daily Weight in k (12 May 2022 08:11)  I&O's Summary    12 May 2022 07:01  -  13 May 2022 04:42  --------------------------------------------------------  IN: 75 mL / OUT: 0 mL / NET: 75 mL          Physical Exam:  Appearance: Normal  Eyes: PERRL, EOMI  HENT: Normal oral [place], NC/AT  Cardiovascular: S1S2, RRR, No M/R/G, no JVD, No Lower extremity edema  Procedural Access Site: No hematoma, Non-tender to palpation, 2+ pulse, No bruit, No Ecchymosis  Respiratory: Clear to auscultation bilaterally  Gastrointestinal: Soft, Non tender, Normal Bowel Sounds  Musculoskeletal: No clubbing, No joint deformity   Neurologic: Non-focal  Lymphatic: No lymphadenopathy  Psychiatry: AAOx3, Mood & affect appropriate  Skin: No rashes, No ecchymoses, No cyanosis        142  |  105  |  14  ----------------------------<  108<H>  3.9   |  27  |  0.94    Ca    9.1      12 May 2022 09:00    TPro  7.1  /  Alb  4.6  /  TBili  0.7  /  DBili  x   /  AST  24  /  ALT  24  /  AlkPhos  61              Lipid panel   Hgb A1c                         14.8   4.60  )-----------( 164      ( 12 May 2022 09:00 )             43.4         ECG: SR 68 bpm    CAD: Monitor groin site for swelling, bleeding  Continue ASA, Plavix     HTN: Continue antihypertensive medications with hold parameters     HLD: continue statin, confirm lipid panel results     Imaging:    Interpretation of Telemetry:

## 2022-05-16 PROBLEM — Z87.438 PERSONAL HISTORY OF OTHER DISEASES OF MALE GENITAL ORGANS: Chronic | Status: ACTIVE | Noted: 2022-05-12

## 2022-05-31 ENCOUNTER — APPOINTMENT (OUTPATIENT)
Dept: CARDIOLOGY | Facility: CLINIC | Age: 77
End: 2022-05-31
Payer: MEDICARE

## 2022-05-31 VITALS
SYSTOLIC BLOOD PRESSURE: 135 MMHG | HEART RATE: 60 BPM | BODY MASS INDEX: 27 KG/M2 | HEIGHT: 67 IN | WEIGHT: 172 LBS | OXYGEN SATURATION: 97 % | DIASTOLIC BLOOD PRESSURE: 76 MMHG

## 2022-05-31 PROCEDURE — 99214 OFFICE O/P EST MOD 30 MIN: CPT

## 2022-05-31 PROCEDURE — 93000 ELECTROCARDIOGRAM COMPLETE: CPT

## 2022-05-31 NOTE — DISCUSSION/SUMMARY
[FreeTextEntry1] : \par Plan:\par 1. Continue current medications\par 2. Patient to follow-up with usual cardiologist\par 3. Happy to review as required.

## 2022-05-31 NOTE — HISTORY OF PRESENT ILLNESS
[FreeTextEntry1] : Mr Shi is a 76 year old man who presents for review of abnormal stress test. PMHx notable for previous CABG (LIMA-LAD, SVG-D1, SVG-RPDA). Cath 2018 revealed occluded SVG-RPDA and PCI was performed to native RCA (mid [place] 2.5, prox [place] 3.0). Recently reports CCS II symptoms with stress nuclear 4/8/22 reporting inferolateral ischemia. Symptomatic despite BB + CCB. Discussed conservative vs invasive management. Agreed should proceed with invasive angiogram. \par \par Follow-up 5/31/22 - cath May 2022 Patent LIMA-LAD and SVG-D1. Native RCA severe ostial ISR (previous\par stent protruding into aorta). This was treated with 3.5mm Shockwave IVL + 4.0mm NC POBA under IVUS guidance. Patient feeling well post-procedure with significant resolution of symptoms. \par

## 2023-01-17 ENCOUNTER — NON-APPOINTMENT (OUTPATIENT)
Age: 78
End: 2023-01-17

## 2023-01-17 ENCOUNTER — APPOINTMENT (OUTPATIENT)
Dept: CARDIOLOGY | Facility: CLINIC | Age: 78
End: 2023-01-17
Payer: MEDICARE

## 2023-01-17 VITALS
HEIGHT: 67 IN | DIASTOLIC BLOOD PRESSURE: 80 MMHG | OXYGEN SATURATION: 97 % | BODY MASS INDEX: 28.09 KG/M2 | SYSTOLIC BLOOD PRESSURE: 112 MMHG | WEIGHT: 179 LBS | HEART RATE: 62 BPM

## 2023-01-17 VITALS — SYSTOLIC BLOOD PRESSURE: 116 MMHG | DIASTOLIC BLOOD PRESSURE: 80 MMHG

## 2023-01-17 DIAGNOSIS — Z80.3 FAMILY HISTORY OF MALIGNANT NEOPLASM OF BREAST: ICD-10-CM

## 2023-01-17 DIAGNOSIS — Z80.0 FAMILY HISTORY OF MALIGNANT NEOPLASM OF DIGESTIVE ORGANS: ICD-10-CM

## 2023-01-17 DIAGNOSIS — R25.2 CRAMP AND SPASM: ICD-10-CM

## 2023-01-17 PROCEDURE — 93000 ELECTROCARDIOGRAM COMPLETE: CPT

## 2023-01-17 PROCEDURE — 99204 OFFICE O/P NEW MOD 45 MIN: CPT

## 2023-01-17 RX ORDER — CALCIUM CARBONATE 300MG(750)
1000 TABLET,CHEWABLE ORAL
Refills: 0 | Status: ACTIVE | COMMUNITY

## 2023-01-17 RX ORDER — TELMISARTAN 40 MG/1
40 TABLET ORAL
Qty: 90 | Refills: 2 | Status: DISCONTINUED | COMMUNITY
End: 2023-01-17

## 2023-01-18 NOTE — HISTORY OF PRESENT ILLNESS
[FreeTextEntry1] : ROSENDA GATES  is a 77 year old  M\par \par \par \par \par Prior cardiovascular care in [place]. \par \par Initially underwent bypass surgery in 1996 at the age of 51 at [facility] (LIMA-LAD, SVG-D1, SVG-RPDA).  \par Underwent a repeat catheterization with 2 stents 4 years ago DAVID RCA. \par Last cardiac patent LIMA to LAD vein graft to diagonal native RCA severe ostial in-stent restenosis underwent shockwave lithotripsy and POBA under IVUS guidance \par History of hypertension and mixed dyslipidemia with low HDL, BPH\par There is no prior history of a clinical myocardial infarction \par There is no history of symptomatic congestive heart failure rheumatic heart disease or valvular disease.\par There is no history of symptomatic arrhythmias including atrial fibrillation.\par \par There is chronic atypical chest discomfort.  \par There is lightheadedness with change in position. \par There is no exertional chest pain, pressure or discomfort. \par There is no significant dyspnea on exertion or orthopnea. \par There are no symptomatic palpitations\par \par Retired CPA.  \par Now lives in the [place].  \par \par cath left main 80% stenosis LAD occluded left circumflex mild atherosclerosis proximal RCA 85% in-stent restenosis patent vein graft to diagonal patent LIMA to LAD report has been reviewed\par \par Today's EKG demonstrates normal sinus rhythm with a first-degree AV block and right bundle branch block.  \par Last blood work September 2022 hemoglobin 15.4 creatinine 0.9 total cholesterol 130 LDL 70 \par last stress test April 2022 Lexiscan inferior and lateral defects normal EF \par \par

## 2023-01-18 NOTE — HISTORY OF PRESENT ILLNESS
[FreeTextEntry1] : ROSENDA GATES  is a 77 year old  M\par \par \par \par \par Prior cardiovascular care in [place]. \par \par Initially underwent bypass surgery in 1996 at the age of 51 at [facility] (LIMA-LAD, SVG-D1, SVG-RPDA).  \par Underwent a repeat catheterization with 2 stents 4 years ago DAVID RCA. \par Last cardiac patent LIMA to LAD vein graft to diagonal native RCA severe ostial in-stent restenosis underwent shockwave lithotripsy and POBA under IVUS guidance \par History of hypertension and mixed dyslipidemia with low HDL, BPH\par There is no prior history of a clinical myocardial infarction \par There is no history of symptomatic congestive heart failure rheumatic heart disease or valvular disease.\par There is no history of symptomatic arrhythmias including atrial fibrillation.\par \par There is chronic atypical chest discomfort.  \par There is lightheadedness with change in position. \par There is no exertional chest pain, pressure or discomfort. \par There is no significant dyspnea on exertion or orthopnea. \par There are no symptomatic palpitations\par \par Retired CPA.  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  \par \par cath left main 80% stenosis LAD occluded left circumflex mild atherosclerosis proximal RCA 85% in-stent restenosis patent vein graft to diagonal patent LIMA to LAD report has been reviewed\par \par Today's EKG demonstrates normal sinus rhythm with a first-degree AV block and right bundle branch block.  \par Last blood work September 2022 hemoglobin 15.4 creatinine 0.9 total cholesterol 130 LDL 70 \par last stress test April 2022 Lexiscan inferior and lateral defects normal EF \par \par

## 2023-01-18 NOTE — ASSESSMENT
[FreeTextEntry1] : Follow-up ultrasound imaging.  \par Readdress ischemic evaluation at clinical follow-up.  \par \par Continue antiplatelet therapy.  Reviewed bleeding precautions.\par Increase statin therapy.  \par Continue antihypertensive therapy including beta-blocker and calcium channel blocker.  \par Adjunctive dietary measures.  Low-sodium heart healthy diet.  Aerobic exercise.  \par Follow-up blood work.  \par Instructed to call if adverse effect\par

## 2023-02-02 ENCOUNTER — APPOINTMENT (OUTPATIENT)
Dept: CARDIOLOGY | Facility: CLINIC | Age: 78
End: 2023-02-02
Payer: MEDICARE

## 2023-02-02 PROCEDURE — 93306 TTE W/DOPPLER COMPLETE: CPT

## 2023-02-02 PROCEDURE — 93880 EXTRACRANIAL BILAT STUDY: CPT

## 2023-02-02 PROCEDURE — 93979 VASCULAR STUDY: CPT

## 2023-02-08 ENCOUNTER — APPOINTMENT (OUTPATIENT)
Dept: CARDIOLOGY | Facility: CLINIC | Age: 78
End: 2023-02-08
Payer: MEDICARE

## 2023-02-08 VITALS
TEMPERATURE: 98.4 F | SYSTOLIC BLOOD PRESSURE: 118 MMHG | DIASTOLIC BLOOD PRESSURE: 66 MMHG | WEIGHT: 175 LBS | HEIGHT: 67 IN | OXYGEN SATURATION: 98 % | BODY MASS INDEX: 27.47 KG/M2 | HEART RATE: 71 BPM

## 2023-02-08 PROCEDURE — 99214 OFFICE O/P EST MOD 30 MIN: CPT

## 2023-02-08 RX ORDER — SILODOSIN 8 MG/1
8 CAPSULE ORAL DAILY
Qty: 90 | Refills: 0 | Status: ACTIVE | COMMUNITY
Start: 1900-01-01 | End: 1900-01-01

## 2023-02-11 NOTE — HISTORY OF PRESENT ILLNESS
[FreeTextEntry1] : ROSENDA GATES  is a 77 year old  M\par \par Prior cardiovascular care in [place]. \par \par Initially underwent bypass surgery in 1996 at the age of 51 at [facility] (LIMA-LAD, SVG-D1, SVG-RPDA).  \par Underwent a repeat catheterization with 2 stents 4 years ago DAVID RCA. \par Last cardiac patent LIMA to LAD vein graft to diagonal native RCA severe ostial in-stent restenosis underwent shockwave lithotripsy and POBA under IVUS guidance \par History of hypertension and mixed dyslipidemia with low HDL, BPH\par There is no prior history of a clinical myocardial infarction \par There is no history of symptomatic congestive heart failure rheumatic heart disease.\par There is no history of symptomatic arrhythmias including atrial fibrillation.\par \par He is active at the gym over an hour.  \par There is minor bruising on antiplatelet therapy. \par There is chronic atypical chest discomfort.  \par There is lightheadedness with change in position. \par There is no exertional chest pain, pressure or discomfort. \par There is no significant dyspnea on exertion or orthopnea. \par There are no symptomatic palpitations\par \par Abdominal ultrasound no aneurysm mild to moderate atherosclerosis \par echocardiogram mild left ventricular hypertrophy mild valvular heart disease ejection fraction 60% borderline aortic dilatation \par carotid Doppler mild to moderate atherosclerosis \par \par Retired CPA.  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  \par \par cath left main 80% stenosis LAD occluded left circumflex mild atherosclerosis proximal RCA 85% in-stent restenosis patent vein graft to diagonal patent LIMA to LAD report has been reviewed\par \par EKG demonstrates normal sinus rhythm with a first-degree AV block and right bundle branch block.  \par Last blood work September 2022 hemoglobin 15.4 creatinine 0.9 total cholesterol 130 LDL 70 \par last stress test April 2022 Lexiscan inferior and lateral defects normal EF \par

## 2023-02-11 NOTE — ASSESSMENT
[FreeTextEntry1] : CAD s/p CABG then PCIs\par subclinical atherosclerosis\par hypertensive heart disease \par \par blood pressure control \par monitor aortic dimensions \par Will consider ischemic evaluation at clinical follow-up.  It was notable that he had no significant symptoms prior to his last percutaneous procedures.  \par Follow-up with primary physician regarding health maintenance and noncardiovascular medications.  \par \par Continue antiplatelet therapy.  Reviewed bleeding precautions.\par Increased statin therapy at [facility].  Follow-up blood work has been requested. \par Continue antihypertensive therapy including beta-blocker and calcium channel blocker.  \par Adjunctive dietary measures.  Low-sodium heart healthy diet.  Aerobic exercise.  \par  \par Instructed to call if adverse effect\par instructed to notify our office if any new symptoms

## 2023-02-14 ENCOUNTER — NON-APPOINTMENT (OUTPATIENT)
Age: 78
End: 2023-02-14

## 2023-02-14 ENCOUNTER — APPOINTMENT (OUTPATIENT)
Dept: OPHTHALMOLOGY | Facility: CLINIC | Age: 78
End: 2023-02-14
Payer: MEDICARE

## 2023-02-14 PROCEDURE — 92134 CPTRZ OPH DX IMG PST SGM RTA: CPT

## 2023-02-14 PROCEDURE — 92004 COMPRE OPH EXAM NEW PT 1/>: CPT

## 2023-04-03 ENCOUNTER — APPOINTMENT (OUTPATIENT)
Dept: UROLOGY | Facility: CLINIC | Age: 78
End: 2023-04-03
Payer: MEDICARE

## 2023-04-03 ENCOUNTER — NON-APPOINTMENT (OUTPATIENT)
Age: 78
End: 2023-04-03

## 2023-04-03 VITALS
RESPIRATION RATE: 16 BRPM | DIASTOLIC BLOOD PRESSURE: 78 MMHG | HEIGHT: 67 IN | BODY MASS INDEX: 28.09 KG/M2 | HEART RATE: 62 BPM | OXYGEN SATURATION: 99 % | SYSTOLIC BLOOD PRESSURE: 149 MMHG | TEMPERATURE: 98 F | WEIGHT: 179 LBS

## 2023-04-03 DIAGNOSIS — R33.9 RETENTION OF URINE, UNSPECIFIED: ICD-10-CM

## 2023-04-03 PROCEDURE — 99203 OFFICE O/P NEW LOW 30 MIN: CPT

## 2023-04-03 NOTE — LETTER BODY
[Dear  ___] : Dear  [unfilled], [Consult Letter:] : I had the pleasure of evaluating your patient, [unfilled]. [Please see my note below.] : Please see my note below. [Consult Closing:] : Thank you very much for allowing me to participate in the care of this patient.  If you have any questions, please do not hesitate to contact me. [Sincerely,] : Sincerely, [FreeTextEntry3] : Carmen Tamayo MD\par Urologic Oncology\par Robotic & Endoscopic urology\par [facility] [place] [facility] [place]\par [facility]\par

## 2023-04-03 NOTE — HISTORY OF PRESENT ILLNESS
[FreeTextEntry1] : 77 CAD, CABg, has stent,  HTN HL, BPH patient: on finasteride + silodosin 8mg daily \par IPSS / KASEY\par Fam Hx\par Smoker\par Blood thinners: aspirin / plavix\par PSgH: CABG, Cath\par \par office [id number]

## 2023-04-03 NOTE — ASSESSMENT
[FreeTextEntry1] : 77 male CAD CABG cardiac stents HTN HL BPH on dual medical therapy for low-dose and 8 mg plus finasteride 5 mg\par IPSS 12\par Office PVR = 235 cc\par \par Plan\par Ultrasound pelvis for prostate size and PVR\par RTC 1 week\par Keep same meds

## 2023-04-06 ENCOUNTER — RESULT REVIEW (OUTPATIENT)
Age: 78
End: 2023-04-06

## 2023-04-06 ENCOUNTER — APPOINTMENT (OUTPATIENT)
Dept: ULTRASOUND IMAGING | Facility: CLINIC | Age: 78
End: 2023-04-06
Payer: MEDICARE

## 2023-04-06 PROCEDURE — 76857 US EXAM PELVIC LIMITED: CPT

## 2023-04-17 ENCOUNTER — APPOINTMENT (OUTPATIENT)
Dept: UROLOGY | Facility: CLINIC | Age: 78
End: 2023-04-17
Payer: MEDICARE

## 2023-04-17 VITALS
HEART RATE: 80 BPM | DIASTOLIC BLOOD PRESSURE: 74 MMHG | SYSTOLIC BLOOD PRESSURE: 160 MMHG | WEIGHT: 179 LBS | BODY MASS INDEX: 28.09 KG/M2 | OXYGEN SATURATION: 98 % | TEMPERATURE: 97.1 F | RESPIRATION RATE: 16 BRPM | HEIGHT: 67 IN

## 2023-04-17 PROCEDURE — 99214 OFFICE O/P EST MOD 30 MIN: CPT

## 2023-04-17 NOTE — HISTORY OF PRESENT ILLNESS
[FreeTextEntry1] : 77 CAD, CABG, has stent,  HTN HL, BPH patient: on finasteride + silodosin 8mg daily \par IPSS / KASEY\par Fam Hx\par Smoker\par Blood thinners: aspirin / plavix\par PSgH: CABG, Cath\par \par office IHF=[id number]\par \par April 17: US pelvis: Post void volume: 291 ml  /  Enlarged prostate measuring 144 cc in volume.

## 2023-04-17 NOTE — HISTORY OF PRESENT ILLNESS
[FreeTextEntry1] : 77 CAD, CABG, has stent,  HTN HL, BPH patient: on finasteride + silodosin 8mg daily \par IPSS / KASEY\par Fam Hx\par Smoker\par Blood thinners: aspirin / plavix\par PSgH: CABG, Cath\par \par office NLS=[id number]\par \par April 17: US pelvis: Post void volume: 291 ml  /  Enlarged prostate measuring 144 cc in volume.

## 2023-04-17 NOTE — ASSESSMENT
[FreeTextEntry1] : 77 male CAD CABG cardiac stents HTN HL BPH on dual medical therapy silodosin 8 mg plus finasteride 5 mg\par IPSS 12\par Office PVR = 235 cc\par April 17: US pelvis: Post void volume: 291 ml  /  Enlarged prostate measuring 144 cc in volume. \par patient has excellent performance status: does 50 mins cardio work three times a week. \par walks up a [employer] three times per week.\par \par Plan\par robotic simple prostatectomy\par cardiology clearance to stop ASA and plavix prior to procedure\par \par Details regarding indications, risks, and benefits of the procedure were thoroughly explained to the patient.\par All images and labs were reviewed and explained thoroughly\par All the patient's questions were answered to the best of my ability.\par \par

## 2023-04-17 NOTE — LETTER BODY
[Dear  ___] : Dear  [unfilled], [Consult Letter:] : I had the pleasure of evaluating your patient, [unfilled]. [Please see my note below.] : Please see my note below. [Consult Closing:] : Thank you very much for allowing me to participate in the care of this patient.  If you have any questions, please do not hesitate to contact me. [Sincerely,] : Sincerely, [FreeTextEntry3] : Carmen Tamayo MD\par Urologic Oncology\par Robotic & Endoscopic urology\par [facility] at [place]\par [facility]\par

## 2023-04-24 ENCOUNTER — NON-APPOINTMENT (OUTPATIENT)
Age: 78
End: 2023-04-24

## 2023-04-24 ENCOUNTER — APPOINTMENT (OUTPATIENT)
Dept: CARDIOLOGY | Facility: CLINIC | Age: 78
End: 2023-04-24
Payer: MEDICARE

## 2023-04-24 VITALS
HEIGHT: 67 IN | SYSTOLIC BLOOD PRESSURE: 116 MMHG | DIASTOLIC BLOOD PRESSURE: 62 MMHG | HEART RATE: 71 BPM | BODY MASS INDEX: 28.41 KG/M2 | WEIGHT: 181 LBS | OXYGEN SATURATION: 96 %

## 2023-04-24 PROCEDURE — 99214 OFFICE O/P EST MOD 30 MIN: CPT

## 2023-04-24 NOTE — ASSESSMENT
[FreeTextEntry1] : ROSENDA GATES  is a 77 year M  who presents today Apr 24, 2023 with the above history and the following active issues. \par \par CAD s/p CABG then PCIs\par subclinical atherosclerosis\par Continue antiplatelet therapy.  Reviewed bleeding precautions.\par \par Hypertensive heart disease Continue antihypertensive therapy including beta-blocker and calcium channel blocker.  Adjunctive dietary measures.  Low-sodium heart healthy diet.  Aerobic exercise.  \par  \par Hyperlipidemia - continue Rosuvastatin. \par Lifestyle and risk factor modification. \par \par \par Preoperative status\par At present, there are no active cardiac conditions. \par No recent unstable coronary syndromes, decompensated heart failure, severe valvular heart disease or significant dysrhythmias.  \par Baseline functional status is good with no new exertional complaints.     \par The clinical benefit of the proposed procedure outweighs the associated cardiovascular risk.  \par Risk not attenuated with further CV testing.  \par Prior testing as outlined above.\par Optimized from a cardiovascular perspective.\par Minimize time off Plavix. Continue ASA \par Continue beta blocker\par \par Red flag symptoms which would warrant sooner emergent evaluation reviewed with the patient. \par Questions and concerns were addressed and answered. \par Limitations of non-invasive testing reviewed\par \par Sincerely,\par \par Emily Hernandez PA-C\par Patients history, testing and plan reviewed with supervising MD: Dr. Tamar Orozco

## 2023-04-24 NOTE — HISTORY OF PRESENT ILLNESS
[FreeTextEntry1] : ROSENDA GATES  is a 77 year M  who presents today Apr 24, 2023 for preoperative clearance for upcoming robotic simple prostatectomy with Dr. Tamayo at [facility] (phone [phone] fax [phone])\par Overall he has been feeling well. There has been no recent illness or hospital stay. Medications have remained unchanged. Asymptomatic from cardiovascular and arrhythmia standpoint. \par Active with no new exertional complaints\par \par Today he denies chest pain, pressure, unusual shortness of breath, lightheadedness, dizziness, near syncope or syncope. \par \par Prior cardiovascular care in [place]. \par \par Initially underwent bypass surgery in 1996 at the age of 51 at [facility] (LIMA-LAD, SVG-D1, SVG-RPDA).  \par Underwent a repeat catheterization with 2 stents 4 years ago DAVID RCA. \par Last cardiac patent LIMA to LAD vein graft to diagonal native RCA severe ostial in-stent restenosis underwent shockwave lithotripsy and POBA under IVUS guidance \par History of hypertension and mixed dyslipidemia with low HDL, BPH\par There is no prior history of a clinical myocardial infarction \par There is no history of symptomatic congestive heart failure rheumatic heart disease.\par There is no history of symptomatic arrhythmias including atrial fibrillation.\par \par \par \par Retired CPA.  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  \par \par Abdominal ultrasound no aneurysm mild to moderate atherosclerosis \par echocardiogram mild left ventricular hypertrophy mild valvular heart disease ejection fraction 60% borderline aortic dilatation \par carotid Doppler mild to moderate atherosclerosis \par \par cath left main 80% stenosis LAD occluded left circumflex mild atherosclerosis proximal RCA 85% in-stent restenosis patent vein graft to diagonal patent LIMA to LAD report has been reviewed\par \par EKG demonstrates normal sinus rhythm with a first-degree AV block and right bundle branch block.  \par Last blood work September 2022 hemoglobin 15.4 creatinine 0.9 total cholesterol 130 LDL 70 \par last stress test April 2022 Lexiscan inferior and lateral defects normal EF \par

## 2023-07-05 ENCOUNTER — APPOINTMENT (OUTPATIENT)
Dept: CARDIOLOGY | Facility: CLINIC | Age: 78
End: 2023-07-05
Payer: MEDICARE

## 2023-07-05 VITALS
HEART RATE: 76 BPM | BODY MASS INDEX: 27.78 KG/M2 | WEIGHT: 177 LBS | SYSTOLIC BLOOD PRESSURE: 130 MMHG | HEIGHT: 67 IN | DIASTOLIC BLOOD PRESSURE: 70 MMHG | OXYGEN SATURATION: 97 %

## 2023-07-05 DIAGNOSIS — N13.8 BENIGN PROSTATIC HYPERPLASIA WITH LOWER URINARY TRACT SYMPMS: ICD-10-CM

## 2023-07-05 DIAGNOSIS — N40.1 BENIGN PROSTATIC HYPERPLASIA WITH LOWER URINARY TRACT SYMPMS: ICD-10-CM

## 2023-07-05 PROCEDURE — 99214 OFFICE O/P EST MOD 30 MIN: CPT

## 2023-07-05 NOTE — HISTORY OF PRESENT ILLNESS
[FreeTextEntry1] : ROSENDA GATES  is a 77 year M  who presents today Jul 05, 2023 for upcoming TURP procedure. \par Overall he has been feeling well. There has been no recent illness or hospital stay. Medications have remained unchanged. Asymptomatic from cardiovascular and arrhythmia standpoint. \par Active with no new exertional complaints\par \par Today he denies chest pain, pressure, unusual shortness of breath, lightheadedness, dizziness, near syncope or syncope. \par \par Prior cardiovascular care in [place]. \par \par Initially underwent bypass surgery in 1996 at the age of 51 at [facility] (LIMA-LAD, SVG-D1, SVG-RPDA).  \par Underwent a repeat catheterization with 2 stents 4 years ago DAVID RCA. \par Last cardiac patent LIMA to LAD vein graft to diagonal native RCA severe ostial in-stent restenosis underwent shockwave lithotripsy and POBA under IVUS guidance \par History of hypertension and mixed dyslipidemia with low HDL, BPH\par There is no prior history of a clinical myocardial infarction \par There is no history of symptomatic congestive heart failure rheumatic heart disease.\par There is no history of symptomatic arrhythmias including atrial fibrillation.\par \par \par \par Retired CPA.  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  \par \par Abdominal ultrasound no aneurysm mild to moderate atherosclerosis \par echocardiogram mild left ventricular hypertrophy mild valvular heart disease ejection fraction 60% borderline aortic dilatation \par carotid Doppler mild to moderate atherosclerosis \par \par cath left main 80% stenosis LAD occluded left circumflex mild atherosclerosis proximal RCA 85% in-stent restenosis patent vein graft to diagonal patent LIMA to LAD report has been reviewed\par \par EKG demonstrates normal sinus rhythm with a first-degree AV block and right bundle branch block.  \par Last blood work September 2022 hemoglobin 15.4 creatinine 0.9 total cholesterol 130 LDL 70 \par last stress test April 2022 Lexiscan inferior and lateral defects normal EF \par

## 2023-07-05 NOTE — ASSESSMENT
[FreeTextEntry1] : ROSENDA GATES  is a 77 year M  who presents today Jul 05, 2023 with the above history and the following active issues. \par \par CAD s/p CABG then PCIs\par subclinical atherosclerosis\par Continue antiplatelet therapy.  Reviewed bleeding precautions.\par \par Hypertensive heart disease Continue antihypertensive therapy including beta-blocker and calcium channel blocker.  Adjunctive dietary measures.  Low-sodium heart healthy diet.  Aerobic exercise.  \par  \par Hyperlipidemia - continue Rosuvastatin. \par Lifestyle and risk factor modification. \par \par \par Preoperative status\par At present, there are no active cardiac conditions. \par No recent unstable coronary syndromes, decompensated heart failure, severe valvular heart disease or significant dysrhythmias.  \par Baseline functional status is good with no new exertional complaints.     \par The clinical benefit of the proposed procedure outweighs the associated cardiovascular risk.  \par Risk not attenuated with further CV testing.  \par Prior testing as outlined above.\par Optimized from a cardiovascular perspective.\par Minimize time off Plavix. Continue ASA \par Continue beta blocker\par \par Red flag symptoms which would warrant sooner emergent evaluation reviewed with the patient. \par Questions and concerns were addressed and answered. \par Limitations of non-invasive testing reviewed\par \par Sincerely,\par \par Emily Hernandze PA-C\par Patients history, testing and plan reviewed with supervising MD: Dr. Azra Ahumada

## 2023-08-22 ENCOUNTER — APPOINTMENT (OUTPATIENT)
Dept: OPHTHALMOLOGY | Facility: CLINIC | Age: 78
End: 2023-08-22

## 2023-09-06 ENCOUNTER — APPOINTMENT (OUTPATIENT)
Dept: CARDIOLOGY | Facility: CLINIC | Age: 78
End: 2023-09-06
Payer: MEDICARE

## 2023-09-06 VITALS
WEIGHT: 172 LBS | OXYGEN SATURATION: 97 % | HEIGHT: 67 IN | BODY MASS INDEX: 27 KG/M2 | HEART RATE: 74 BPM | DIASTOLIC BLOOD PRESSURE: 60 MMHG | SYSTOLIC BLOOD PRESSURE: 100 MMHG

## 2023-09-06 PROCEDURE — 99214 OFFICE O/P EST MOD 30 MIN: CPT

## 2023-09-06 NOTE — HISTORY OF PRESENT ILLNESS
[FreeTextEntry1] : ROSENDA GATES  is a 77 year M  who presents today Jul 05, 2023 for upcoming TURP procedure.  Overall he has been feeling well. There has been no recent illness or hospital stay. Medications have remained unchanged. Asymptomatic from cardiovascular and arrhythmia standpoint.  Active with no new exertional complaints He underwent a TURP procedure.  He is undergoing an opinion about his prostate.  He had seen one urologist.  This was unable to be performed in surgery center due to abnormal stress test.  A follow-up stress test has been requested.  Recent blood work has been reviewed.  Triglycerides 131 HDL 28 LDL 45 hemoglobin 14.4 there was an atypical differential.  He reports having repeat blood work.  A1c 5.9  LPA 65.  Copy the blood work has been provided to the patient Today he denies chest pain, pressure, unusual shortness of breath, lightheadedness, dizziness, near syncope or syncope.   Prior cardiovascular care in [place].   Initially underwent bypass surgery in 1996 at the age of 51 at [facility] (LIMA-LAD, SVG-D1, SVG-RPDA).   Underwent a repeat catheterization with 2 stents 4 years ago DAVID RCA.  Last cardiac patent LIMA to LAD vein graft to diagonal native RCA severe ostial in-stent restenosis underwent shockwave lithotripsy and POBA under IVUS guidance  History of hypertension and mixed dyslipidemia with low HDL, BPH There is no prior history of a clinical myocardial infarction  There is no history of symptomatic congestive heart failure rheumatic heart disease. There is no history of symptomatic arrhythmias including atrial fibrillation.    Retired CPA.   Now lives in the [place].    Abdominal ultrasound no aneurysm mild to moderate atherosclerosis  echocardiogram mild left ventricular hypertrophy mild valvular heart disease ejection fraction 60% borderline aortic dilatation  carotid Doppler mild to moderate atherosclerosis   cath left main 80% stenosis LAD occluded left circumflex mild atherosclerosis proximal RCA 85% in-stent restenosis patent vein graft to diagonal patent LIMA to LAD report has been reviewed  EKG demonstrates normal sinus rhythm with a first-degree AV block and right bundle branch block.   Last blood work September 2022 hemoglobin 15.4 creatinine 0.9 total cholesterol 130 LDL 70  last stress test April 2022 Lexiscan inferior and lateral defects normal EF    CAD s/p CABG then PCIs subclinical atherosclerosis Continue antiplatelet therapy.  Reviewed bleeding precautions.  Hypertensive heart disease Continue antihypertensive therapy including beta-blocker and calcium channel blocker.  Adjunctive dietary measures.  Low-sodium heart healthy diet.  Aerobic exercise.     Hyperlipidemia - continue Rosuvastatin.  Lifestyle and risk factor modification.    Preoperative status At present, there are no active cardiac conditions.  No recent unstable coronary syndromes, decompensated heart failure, severe valvular heart disease or significant dysrhythmias.   Baseline functional status is good with no new exertional complaints.      The clinical benefit of the proposed procedure outweighs the associated cardiovascular risk.   Risk not attenuated with further CV testing.   Prior testing as outlined above. Optimized from a cardiovascular perspective. Minimize time off Plavix. Continue ASA  Continue beta blocker  Red flag symptoms which would warrant sooner emergent evaluation reviewed with the patient.  Questions and concerns were addressed and answered.  Limitations of non-invasive testing reviewed

## 2023-09-21 ENCOUNTER — APPOINTMENT (OUTPATIENT)
Dept: CARDIOLOGY | Facility: CLINIC | Age: 78
End: 2023-09-21
Payer: MEDICARE

## 2023-09-21 PROCEDURE — 93015 CV STRESS TEST SUPVJ I&R: CPT

## 2023-09-21 PROCEDURE — A9502: CPT

## 2023-09-21 PROCEDURE — 78452 HT MUSCLE IMAGE SPECT MULT: CPT

## 2023-10-13 ENCOUNTER — APPOINTMENT (OUTPATIENT)
Dept: CARDIOLOGY | Facility: CLINIC | Age: 78
End: 2023-10-13
Payer: MEDICARE

## 2023-10-13 VITALS
HEART RATE: 67 BPM | SYSTOLIC BLOOD PRESSURE: 106 MMHG | WEIGHT: 172 LBS | DIASTOLIC BLOOD PRESSURE: 64 MMHG | RESPIRATION RATE: 14 BRPM | OXYGEN SATURATION: 98 % | HEIGHT: 67 IN | BODY MASS INDEX: 27 KG/M2

## 2023-10-13 DIAGNOSIS — R42 DIZZINESS AND GIDDINESS: ICD-10-CM

## 2023-10-13 DIAGNOSIS — Z01.810 ENCOUNTER FOR PREPROCEDURAL CARDIOVASCULAR EXAMINATION: ICD-10-CM

## 2023-10-13 DIAGNOSIS — R07.89 OTHER CHEST PAIN: ICD-10-CM

## 2023-10-13 PROCEDURE — 99214 OFFICE O/P EST MOD 30 MIN: CPT

## 2023-12-18 RX ORDER — CLOPIDOGREL BISULFATE 75 MG/1
75 TABLET, FILM COATED ORAL
Qty: 90 | Refills: 3 | Status: ACTIVE | COMMUNITY
Start: 1900-01-01 | End: 1900-01-01

## 2024-02-13 ENCOUNTER — NON-APPOINTMENT (OUTPATIENT)
Age: 79
End: 2024-02-13

## 2024-02-15 RX ORDER — TELMISARTAN 40 MG/1
40 TABLET ORAL
Qty: 90 | Refills: 1 | Status: ACTIVE | COMMUNITY
Start: 1900-01-01 | End: 1900-01-01

## 2024-02-16 ENCOUNTER — APPOINTMENT (OUTPATIENT)
Dept: CARDIOLOGY | Facility: CLINIC | Age: 79
End: 2024-02-16
Payer: MEDICARE

## 2024-02-16 ENCOUNTER — NON-APPOINTMENT (OUTPATIENT)
Age: 79
End: 2024-02-16

## 2024-02-16 VITALS
BODY MASS INDEX: 27.1 KG/M2 | WEIGHT: 173 LBS | SYSTOLIC BLOOD PRESSURE: 104 MMHG | DIASTOLIC BLOOD PRESSURE: 56 MMHG | OXYGEN SATURATION: 96 % | HEART RATE: 72 BPM

## 2024-02-16 DIAGNOSIS — I70.0 ATHEROSCLEROSIS OF AORTA: ICD-10-CM

## 2024-02-16 DIAGNOSIS — I71.20 THORACIC AORTIC ANEURYSM, WITHOUT RUPTURE, UNSPECIFIED: ICD-10-CM

## 2024-02-16 DIAGNOSIS — R94.31 ABNORMAL ELECTROCARDIOGRAM [ECG] [EKG]: ICD-10-CM

## 2024-02-16 PROCEDURE — 93000 ELECTROCARDIOGRAM COMPLETE: CPT

## 2024-02-16 PROCEDURE — 99214 OFFICE O/P EST MOD 30 MIN: CPT

## 2024-02-16 NOTE — HISTORY OF PRESENT ILLNESS
[FreeTextEntry1] : ROSENDA GATES  is a 78 year M    Prior cardiovascular care in [place]. 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 Initially underwent bypass surgery in 1996 at the age of 51 at [facility] (LIMA-LAD, SVG-D1, SVG-RPDA). Underwent a repeat catheterization with 2 stents 4 years ago DAVID RCA. Last cardiac patent LIMA to LAD vein graft to diagonal native RCA severe ostial in-stent restenosis underwent shockwave lithotripsy and POBA under IVUS guidance History of hypertension and mixed dyslipidemia with low HDL, BPH  There is no prior history of a clinical myocardial infarction There is no history of symptomatic congestive heart failure rheumatic heart disease. There is no history of symptomatic arrhythmias including atrial fibrillation.  Overall he has been feeling well.  He goes to  3 days a week to exercise  Medications have remained unchanged.  Active with no new exertional complaints Today he denies chest pain, pressure, unusual shortness of breath, lightheadedness, dizziness, near syncope or syncope.  He is underwent several opinions about his prostate.  Ultimately no procedure was performed and since prostate has "shrunk" on its own.  He now requires a routine colonoscopy for cancer screening. Denies any bleeding events.  There has been no recent illness or hospitalization.    EKG 2/16/24 normal sinus rhythm IRBBB NSSTs unchanged from prior  labs 9/2023 Triglycerides 131 HDL 28 LDL 45 hemoglobin 14.4 there was an atypical differential. He reports having repeat blood work. A1c 5.9  LPA 65.  Nuclear stress test 9/2023 normal myocardial perfusion, no evidence of infarction or inducible ischemia  CV testing 2/2023:  Abdominal ultrasound no aneurysm mild to moderate atherosclerosis echocardiogram mild left ventricular hypertrophy mild valvular heart disease ejection fraction 60% borderline aortic dilatation carotid Doppler mild to moderate atherosclerosis  cath left main 5/2022: 80% stenosis LAD occluded left circumflex mild atherosclerosis proximal RCA 85% in-stent restenosis patent vein graft to diagonal patent LIMA to LAD  stress test April 2022 Lexiscan inferior and lateral defects normal EF EKG demonstrates normal sinus rhythm with a first-degree AV block and right bundle branch block.

## 2024-02-16 NOTE — ASSESSMENT
[FreeTextEntry1] : ROSENDA GATES is a 78 year old M who presents today Feb 16, 2024 with the above history and the following active issues:   Preoperative cardiovascular examination coloscopy At present, there are no active cardiac conditions.  No recent unstable coronary syndromes, decompensated heart failure, severe valvular heart disease or significant dysrhythmias.   Baseline functional status is acceptable.     The clinical benefit of the proposed procedure outweighs the associated cardiovascular risk.   Risk not attenuated with further CV testing.   Prior testing as outlined above. Optimized from a cardiovascular perspective. Cont ASA. May hold plavix 5-7 days prior and restart when hemostasis achieved.  Continue beta blocker DVT ppx  CAD s/p CABG then PCIs last intervention 5/2022 in stent restenosis s/p POBA reviewed nuclear stress test normal myocardial perfusion, no evidence of infarction or inducible ischemia  discussed long term DAPT use - no abnl bleeding will cont at this time  cont statin + beta blocker Monitor EF on echo, requested before next planned routine visit   Hypertensive heart disease Continue antihypertensive therapy including beta-blocker and calcium channel blocker. Adjunctive dietary measures. Low-sodium heart healthy diet. Aerobic exercise.  Hyperlipidemia continue Rosuvastatin. Lifestyle and risk factor modification.  Sincerely,   PHILLY Martínez-C Supervising MD: Dr. Pato Armas

## 2024-02-16 NOTE — ADDENDUM
[FreeTextEntry1] : Please note the patient was reviewed with the NP. I was physically present during the service of the patient I was directly involved in the management plan and recommendations of care provided to the patient.  I personally reviewed the history and physical exam and plan as documented by the NP above.  Pato Armas DO, [facility], [facility] Cardiologist 2/16/2024

## 2024-04-10 ENCOUNTER — APPOINTMENT (OUTPATIENT)
Dept: CARDIOLOGY | Facility: CLINIC | Age: 79
End: 2024-04-10
Payer: MEDICARE

## 2024-04-10 VITALS
HEIGHT: 67 IN | OXYGEN SATURATION: 98 % | HEART RATE: 63 BPM | DIASTOLIC BLOOD PRESSURE: 62 MMHG | SYSTOLIC BLOOD PRESSURE: 120 MMHG | WEIGHT: 172 LBS | BODY MASS INDEX: 27 KG/M2

## 2024-04-10 DIAGNOSIS — I25.10 ATHEROSCLEROTIC HEART DISEASE OF NATIVE CORONARY ARTERY W/OUT ANGINA PECTORIS: ICD-10-CM

## 2024-04-10 DIAGNOSIS — I10 ESSENTIAL (PRIMARY) HYPERTENSION: ICD-10-CM

## 2024-04-10 DIAGNOSIS — E78.5 HYPERLIPIDEMIA, UNSPECIFIED: ICD-10-CM

## 2024-04-10 PROCEDURE — 93306 TTE W/DOPPLER COMPLETE: CPT

## 2024-04-10 PROCEDURE — 99214 OFFICE O/P EST MOD 30 MIN: CPT

## 2024-04-10 RX ORDER — ASPIRIN ENTERIC COATED TABLETS 81 MG 81 MG/1
81 TABLET, DELAYED RELEASE ORAL DAILY
Refills: 0 | Status: DISCONTINUED | COMMUNITY
End: 2024-04-10

## 2024-04-18 RX ORDER — AMLODIPINE BESYLATE 10 MG/1
10 TABLET ORAL DAILY
Qty: 90 | Refills: 3 | Status: ACTIVE | COMMUNITY
Start: 1900-01-01 | End: 1900-01-01

## 2024-04-18 NOTE — HISTORY OF PRESENT ILLNESS
[FreeTextEntry1] : ROSENDA GATES  is a 78 year M    Prior cardiovascular care in [place].  Initially underwent bypass surgery in 1996 at the age of 51 at [facility] (LIMA-LAD, SVG-D1, SVG-RPDA). Underwent a repeat catheterization with 2 stents DAVID RCA. Last cath patent LIMA to LAD vein graft to diagonal native RCA severe ostial in-stent restenosis underwent shockwave lithotripsy and POBA under IVUS guidance History of hypertension and mixed dyslipidemia with low HDL, BPH  There is no prior history of a clinical myocardial infarction There is no history of symptomatic congestive heart failure rheumatic heart disease. There is no history of symptomatic arrhythmias including atrial fibrillation.  Overall he has been feeling well. He goes to  3 days a week to exercise Medications have remained unchanged. Active with no new exertional complaints Today he denies chest pain, pressure, unusual shortness of breath, lightheadedness, dizziness, near syncope or syncope. There has been no recent illness or hospitalization.  Last seen in February.  Echocardiogram from earlier today reviewed. There is normal left ventricular function no significant valvular heart disease.  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  EKG 2/16/24 normal sinus rhythm IRBBB NSSTs unchanged from prior labs 9/2023 Triglycerides 131 HDL 28 LDL 45 hemoglobin 14.4 there was an atypical differential. He reports having repeat blood work. A1c 5.9  LPA 65. Nuclear stress test 9/2023 normal myocardial perfusion, no evidence of infarction or inducible ischemia CV testing 2/2023: Abdominal ultrasound no aneurysm mild to moderate atherosclerosis carotid Doppler mild to moderate atherosclerosis  cath left main 5/2022: 80% stenosis LAD occluded left circumflex mild atherosclerosis proximal RCA 85% in-stent restenosis patent vein graft to diagonal patent LIMA to LAD

## 2024-04-18 NOTE — ASSESSMENT
[FreeTextEntry1] :  echocardiogram and labs reviewed this point I do not see the need for prolonged dual antiplatelet therapy. Single antiplatelet therapy with clopidogrel will suffice.  Follow-up stress test will be scheduled at next office visit.  CAD s/p CABG then PCIs last intervention 5/2022 in stent restenosis reviewed nuclear stress test normal myocardial perfusion, no evidence of infarction or inducible ischemia cont apt, statin + beta blocker  Hypertensive heart disease Continue antihypertensive therapy including beta-blocker and calcium channel blocker. Adjunctive dietary measures. Low-sodium heart healthy diet. Aerobic exercise.  Hyperlipidemia continue Rosuvastatin. Lifestyle and risk factor modification.

## 2024-05-28 RX ORDER — ROSUVASTATIN CALCIUM 10 MG/1
10 TABLET, FILM COATED ORAL DAILY
Qty: 90 | Refills: 3 | Status: ACTIVE | COMMUNITY
Start: 1900-01-01 | End: 1900-01-01

## 2024-05-28 RX ORDER — ATENOLOL 50 MG/1
50 TABLET ORAL DAILY
Qty: 90 | Refills: 3 | Status: ACTIVE | COMMUNITY
Start: 1900-01-01 | End: 1900-01-01

## 2024-10-02 ENCOUNTER — APPOINTMENT (OUTPATIENT)
Dept: CARDIOLOGY | Facility: CLINIC | Age: 79
End: 2024-10-02
Payer: MEDICARE

## 2024-10-02 VITALS
OXYGEN SATURATION: 98 % | HEIGHT: 67 IN | HEART RATE: 56 BPM | DIASTOLIC BLOOD PRESSURE: 62 MMHG | SYSTOLIC BLOOD PRESSURE: 116 MMHG | BODY MASS INDEX: 27.94 KG/M2 | WEIGHT: 178 LBS

## 2024-10-02 DIAGNOSIS — E78.5 HYPERLIPIDEMIA, UNSPECIFIED: ICD-10-CM

## 2024-10-02 DIAGNOSIS — I10 ESSENTIAL (PRIMARY) HYPERTENSION: ICD-10-CM

## 2024-10-02 DIAGNOSIS — I25.10 ATHEROSCLEROTIC HEART DISEASE OF NATIVE CORONARY ARTERY W/OUT ANGINA PECTORIS: ICD-10-CM

## 2024-10-02 PROCEDURE — 99214 OFFICE O/P EST MOD 30 MIN: CPT

## 2024-10-10 ENCOUNTER — APPOINTMENT (OUTPATIENT)
Dept: CARDIOLOGY | Facility: CLINIC | Age: 79
End: 2024-10-10
Payer: MEDICARE

## 2024-10-10 PROCEDURE — 93351 STRESS TTE COMPLETE: CPT

## 2024-10-10 PROCEDURE — 93320 DOPPLER ECHO COMPLETE: CPT

## 2024-10-11 NOTE — HISTORY OF PRESENT ILLNESS
[FreeTextEntry1] : ROSENDA GATES  is a 78 year M   Prior cardiovascular care in [place].  Initially underwent bypass surgery in 1996 at the age of 51 at [facility] (LIMA-LAD, SVG-D1, SVG-RPDA). Underwent a repeat catheterization with 2 stents DAVID RCA. Last cath patent LIMA to LAD vein graft to diagonal native RCA severe ostial in-stent restenosis underwent shockwave lithotripsy and POBA under IVUS guidance History of hypertension and mixed dyslipidemia with low HDL, BPH  Overall he has been feeling well. He goes to  3 days a week to exercise Medications have remained unchanged. Active with no new exertional complaints Today he denies chest pain, pressure, unusual shortness of breath, lightheadedness, dizziness, near syncope or syncope. There has been no recent illness or hospitalization.  Recent blood work September 2024 hemoglobin 14.8 creatinine 1.0 LDL 63 total cholesterol 122 TSH 3.7 A1c 5.7  Echocardiogram normal left ventricular function no significant valvular heart disease. 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  EKG 2/16/24 normal sinus rhythm IRBBB NSSTs unchanged from prior Nuclear stress test 9/2023 normal myocardial perfusion, no evidence of infarction or inducible ischemia CV testing 2/2023: Abdominal ultrasound no aneurysm mild to moderate atherosclerosis carotid Doppler mild to moderate atherosclerosis  cath left main 5/2022: 80% stenosis LAD occluded left circumflex mild atherosclerosis proximal RCA 85% in-stent restenosis patent vein graft to diagonal patent LIMA to LAD

## 2024-10-11 NOTE — ASSESSMENT
[FreeTextEntry1] : It is notable that he did not have significant symptoms with his prior coronary artery disease.  Upcoming blood work requested from primary physician.  Follow-up stress test. Exercise off atenolol. Single antiplatelet therapy with clopidogrel will suffice.  CAD s/p CABG then PCIs last intervention 5/2022 in stent restenosis reviewed nuclear stress test normal myocardial perfusion, no evidence of infarction or inducible ischemia cont apt, statin + beta blocker  Hypertensive heart disease Continue antihypertensive therapy including beta-blocker and calcium channel blocker. Adjunctive dietary measures. Low-sodium heart healthy diet. Aerobic exercise.  Hyperlipidemia continue Rosuvastatin. Lifestyle and risk factor modification.

## 2024-10-11 NOTE — HISTORY OF PRESENT ILLNESS
[FreeTextEntry1] : ROSENDA GATES  is a 78 year M   Prior cardiovascular care in [place].  Initially underwent bypass surgery in 1996 at the age of 51 at [facility] (LIMA-LAD, SVG-D1, SVG-RPDA). Underwent a repeat catheterization with 2 stents DAVID RCA. Last cath patent LIMA to LAD vein graft to diagonal native RCA severe ostial in-stent restenosis underwent shockwave lithotripsy and POBA under IVUS guidance History of hypertension and mixed dyslipidemia with low HDL, BPH  Overall he has been feeling well. He goes to  3 days a week to exercise Medications have remained unchanged. Active with no new exertional complaints Today he denies chest pain, pressure, unusual shortness of breath, lightheadedness, dizziness, near syncope or syncope. There has been no recent illness or hospitalization.  Recent blood work September 2024 hemoglobin 14.8 creatinine 1.0 LDL 63 total cholesterol 122 TSH 3.7 A1c 5.7  Echocardiogram normal left ventricular function no significant valvular heart disease. 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  EKG 2/16/24 normal sinus rhythm IRBBB NSSTs unchanged from prior Nuclear stress test 9/2023 normal myocardial perfusion, no evidence of infarction or inducible ischemia CV testing 2/2023: Abdominal ultrasound no aneurysm mild to moderate atherosclerosis carotid Doppler mild to moderate atherosclerosis  cath left main 5/2022: 80% stenosis LAD occluded left circumflex mild atherosclerosis proximal RCA 85% in-stent restenosis patent vein graft to diagonal patent LIMA to LAD

## 2024-10-11 NOTE — HISTORY OF PRESENT ILLNESS
[FreeTextEntry1] : ROSENDA GATES  is a 78 year M   Prior cardiovascular care in [place].  Initially underwent bypass surgery in 1996 at the age of 51 at [facility] (LIMA-LAD, SVG-D1, SVG-RPDA). Underwent a repeat catheterization with 2 stents DAVID RCA. Last cath patent LIMA to LAD vein graft to diagonal native RCA severe ostial in-stent restenosis underwent shockwave lithotripsy and POBA under IVUS guidance History of hypertension and mixed dyslipidemia with low HDL, BPH  Overall he has been feeling well. He goes to  3 days a week to exercise Medications have remained unchanged. Active with no new exertional complaints Today he denies chest pain, pressure, unusual shortness of breath, lightheadedness, dizziness, near syncope or syncope. There has been no recent illness or hospitalization.  Recent blood work September 2024 hemoglobin 14.8 creatinine 1.0 LDL 63 total cholesterol 122 TSH 3.7 A1c 5.7  Echocardiogram normal left ventricular function no significant valvular heart disease. 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  EKG 2/16/24 normal sinus rhythm IRBBB NSSTs unchanged from prior Nuclear stress test 9/2023 normal myocardial perfusion, no evidence of infarction or inducible ischemia CV testing 2/2023: Abdominal ultrasound no aneurysm mild to moderate atherosclerosis carotid Doppler mild to moderate atherosclerosis  cath left main 5/2022: 80% stenosis LAD occluded left circumflex mild atherosclerosis proximal RCA 85% in-stent restenosis patent vein graft to diagonal patent LIMA to LAD

## 2024-10-11 NOTE — HISTORY OF PRESENT ILLNESS
[FreeTextEntry1] : ROSENDA GATES  is a 78 year M   Prior cardiovascular care in [place].  Initially underwent bypass surgery in 1996 at the age of 51 at [facility] (LIMA-LAD, SVG-D1, SVG-RPDA). Underwent a repeat catheterization with 2 stents DAVID RCA. Last cath patent LIMA to LAD vein graft to diagonal native RCA severe ostial in-stent restenosis underwent shockwave lithotripsy and POBA under IVUS guidance History of hypertension and mixed dyslipidemia with low HDL, BPH  Overall he has been feeling well. He goes to  3 days a week to exercise Medications have remained unchanged. Active with no new exertional complaints Today he denies chest pain, pressure, unusual shortness of breath, lightheadedness, dizziness, near syncope or syncope. There has been no recent illness or hospitalization.  Recent blood work September 2024 hemoglobin 14.8 creatinine 1.0 LDL 63 total cholesterol 122 TSH 3.7 A1c 5.7  Echocardiogram normal left ventricular function no significant valvular heart disease. 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  EKG 2/16/24 normal sinus rhythm IRBBB NSSTs unchanged from prior Nuclear stress test 9/2023 normal myocardial perfusion, no evidence of infarction or inducible ischemia CV testing 2/2023: Abdominal ultrasound no aneurysm mild to moderate atherosclerosis carotid Doppler mild to moderate atherosclerosis  cath left main 5/2022: 80% stenosis LAD occluded left circumflex mild atherosclerosis proximal RCA 85% in-stent restenosis patent vein graft to diagonal patent LIMA to LAD

## 2024-10-18 ENCOUNTER — APPOINTMENT (OUTPATIENT)
Dept: RADIOLOGY | Facility: CLINIC | Age: 79
End: 2024-10-18
Payer: MEDICARE

## 2024-10-18 PROCEDURE — 71046 X-RAY EXAM CHEST 2 VIEWS: CPT

## 2024-10-25 ENCOUNTER — APPOINTMENT (OUTPATIENT)
Dept: CARDIOLOGY | Facility: CLINIC | Age: 79
End: 2024-10-25
Payer: MEDICARE

## 2024-10-25 VITALS
SYSTOLIC BLOOD PRESSURE: 126 MMHG | HEART RATE: 70 BPM | DIASTOLIC BLOOD PRESSURE: 60 MMHG | BODY MASS INDEX: 28.19 KG/M2 | WEIGHT: 180 LBS | OXYGEN SATURATION: 98 %

## 2024-10-25 DIAGNOSIS — R94.31 ABNORMAL ELECTROCARDIOGRAM [ECG] [EKG]: ICD-10-CM

## 2024-10-25 DIAGNOSIS — E78.5 HYPERLIPIDEMIA, UNSPECIFIED: ICD-10-CM

## 2024-10-25 DIAGNOSIS — I25.10 ATHEROSCLEROTIC HEART DISEASE OF NATIVE CORONARY ARTERY W/OUT ANGINA PECTORIS: ICD-10-CM

## 2024-10-25 DIAGNOSIS — R94.39 ABNORMAL RESULT OF OTHER CARDIOVASCULAR FUNCTION STUDY: ICD-10-CM

## 2024-10-25 DIAGNOSIS — I10 ESSENTIAL (PRIMARY) HYPERTENSION: ICD-10-CM

## 2024-10-25 DIAGNOSIS — I70.0 ATHEROSCLEROSIS OF AORTA: ICD-10-CM

## 2024-10-25 DIAGNOSIS — I71.20 THORACIC AORTIC ANEURYSM, WITHOUT RUPTURE, UNSPECIFIED: ICD-10-CM

## 2024-10-25 PROCEDURE — 99215 OFFICE O/P EST HI 40 MIN: CPT

## 2024-10-25 RX ORDER — ASPIRIN ENTERIC COATED TABLETS 81 MG 81 MG/1
81 TABLET, DELAYED RELEASE ORAL
Qty: 90 | Refills: 3 | Status: ACTIVE | COMMUNITY
Start: 2024-10-25

## 2024-11-12 ENCOUNTER — TRANSCRIPTION ENCOUNTER (OUTPATIENT)
Age: 79
End: 2024-11-12

## 2024-11-12 ENCOUNTER — RESULT REVIEW (OUTPATIENT)
Age: 79
End: 2024-11-12

## 2024-11-12 ENCOUNTER — INPATIENT (INPATIENT)
Facility: HOSPITAL | Age: 79
LOS: 0 days | Discharge: HOME | DRG: 322 | End: 2024-11-13
Attending: INTERNAL MEDICINE | Admitting: INTERNAL MEDICINE
Payer: MEDICARE

## 2024-11-12 VITALS
DIASTOLIC BLOOD PRESSURE: 83 MMHG | RESPIRATION RATE: 18 BRPM | HEIGHT: 67 IN | SYSTOLIC BLOOD PRESSURE: 161 MMHG | TEMPERATURE: 98 F | WEIGHT: 173.06 LBS | OXYGEN SATURATION: 99 % | HEART RATE: 65 BPM

## 2024-11-12 DIAGNOSIS — Z95.1 PRESENCE OF AORTOCORONARY BYPASS GRAFT: Chronic | ICD-10-CM

## 2024-11-12 DIAGNOSIS — R94.39 ABNORMAL RESULT OF OTHER CARDIOVASCULAR FUNCTION STUDY: ICD-10-CM

## 2024-11-12 LAB
ANION GAP SERPL CALC-SCNC: 10 MMOL/L — SIGNIFICANT CHANGE UP (ref 5–17)
BLD GP AB SCN SERPL QL: NEGATIVE — SIGNIFICANT CHANGE UP
BUN SERPL-MCNC: 15 MG/DL — SIGNIFICANT CHANGE UP (ref 7–23)
CALCIUM SERPL-MCNC: 8.7 MG/DL — SIGNIFICANT CHANGE UP (ref 8.4–10.5)
CHLORIDE SERPL-SCNC: 105 MMOL/L — SIGNIFICANT CHANGE UP (ref 96–108)
CO2 SERPL-SCNC: 28 MMOL/L — SIGNIFICANT CHANGE UP (ref 22–31)
CREAT SERPL-MCNC: 0.96 MG/DL — SIGNIFICANT CHANGE UP (ref 0.5–1.3)
EGFR: 80 ML/MIN/1.73M2 — SIGNIFICANT CHANGE UP
GLUCOSE SERPL-MCNC: 117 MG/DL — HIGH (ref 70–99)
HCT VFR BLD CALC: 42.6 % — SIGNIFICANT CHANGE UP (ref 39–50)
HCT VFR BLD CALC: 45.9 % — SIGNIFICANT CHANGE UP (ref 39–50)
HGB BLD-MCNC: 14.6 G/DL — SIGNIFICANT CHANGE UP (ref 13–17)
HGB BLD-MCNC: 15.7 G/DL — SIGNIFICANT CHANGE UP (ref 13–17)
MCHC RBC-ENTMCNC: 30.7 PG — SIGNIFICANT CHANGE UP (ref 27–34)
MCHC RBC-ENTMCNC: 31.3 PG — SIGNIFICANT CHANGE UP (ref 27–34)
MCHC RBC-ENTMCNC: 34.2 G/DL — SIGNIFICANT CHANGE UP (ref 32–36)
MCHC RBC-ENTMCNC: 34.3 G/DL — SIGNIFICANT CHANGE UP (ref 32–36)
MCV RBC AUTO: 89.7 FL — SIGNIFICANT CHANGE UP (ref 80–100)
MCV RBC AUTO: 91.4 FL — SIGNIFICANT CHANGE UP (ref 80–100)
NRBC # BLD: 0 /100 WBCS — SIGNIFICANT CHANGE UP (ref 0–0)
NRBC # BLD: 0 /100 WBCS — SIGNIFICANT CHANGE UP (ref 0–0)
PLATELET # BLD AUTO: 172 K/UL — SIGNIFICANT CHANGE UP (ref 150–400)
PLATELET # BLD AUTO: 194 K/UL — SIGNIFICANT CHANGE UP (ref 150–400)
POTASSIUM SERPL-MCNC: 3.9 MMOL/L — SIGNIFICANT CHANGE UP (ref 3.5–5.3)
POTASSIUM SERPL-SCNC: 3.9 MMOL/L — SIGNIFICANT CHANGE UP (ref 3.5–5.3)
RBC # BLD: 4.75 M/UL — SIGNIFICANT CHANGE UP (ref 4.2–5.8)
RBC # BLD: 5.02 M/UL — SIGNIFICANT CHANGE UP (ref 4.2–5.8)
RBC # FLD: 12.6 % — SIGNIFICANT CHANGE UP (ref 10.3–14.5)
RBC # FLD: 12.8 % — SIGNIFICANT CHANGE UP (ref 10.3–14.5)
RH IG SCN BLD-IMP: POSITIVE — SIGNIFICANT CHANGE UP
RH IG SCN BLD-IMP: POSITIVE — SIGNIFICANT CHANGE UP
SODIUM SERPL-SCNC: 143 MMOL/L — SIGNIFICANT CHANGE UP (ref 135–145)
TROPONIN T, HIGH SENSITIVITY RESULT: 146 NG/L — HIGH (ref 0–51)
WBC # BLD: 6.94 K/UL — SIGNIFICANT CHANGE UP (ref 3.8–10.5)
WBC # BLD: 9.55 K/UL — SIGNIFICANT CHANGE UP (ref 3.8–10.5)
WBC # FLD AUTO: 6.94 K/UL — SIGNIFICANT CHANGE UP (ref 3.8–10.5)
WBC # FLD AUTO: 9.55 K/UL — SIGNIFICANT CHANGE UP (ref 3.8–10.5)

## 2024-11-12 PROCEDURE — 93010 ELECTROCARDIOGRAM REPORT: CPT | Mod: 77

## 2024-11-12 PROCEDURE — 93010 ELECTROCARDIOGRAM REPORT: CPT

## 2024-11-12 PROCEDURE — 93454 CORONARY ARTERY ANGIO S&I: CPT | Mod: 26,59

## 2024-11-12 PROCEDURE — 93308 TTE F-UP OR LMTD: CPT | Mod: 26,NC

## 2024-11-12 PROCEDURE — 93308 TTE F-UP OR LMTD: CPT | Mod: 26

## 2024-11-12 PROCEDURE — 33016 PERICARDIOCENTESIS W/IMAGING: CPT

## 2024-11-12 RX ORDER — TELMISARTAN 20 MG/1
1 TABLET ORAL
Refills: 0 | DISCHARGE

## 2024-11-12 RX ORDER — SODIUM CHLORIDE 9 MG/ML
1000 INJECTION, SOLUTION INTRAMUSCULAR; INTRAVENOUS; SUBCUTANEOUS
Refills: 0 | Status: DISCONTINUED | OUTPATIENT
Start: 2024-11-12 | End: 2024-11-13

## 2024-11-12 RX ORDER — SODIUM CHLORIDE 9 MG/ML
250 INJECTION, SOLUTION INTRAMUSCULAR; INTRAVENOUS; SUBCUTANEOUS ONCE
Refills: 0 | Status: COMPLETED | OUTPATIENT
Start: 2024-11-12 | End: 2024-11-12

## 2024-11-12 RX ORDER — CLOPIDOGREL 75 MG/1
75 TABLET ORAL DAILY
Refills: 0 | Status: DISCONTINUED | OUTPATIENT
Start: 2024-11-12 | End: 2024-11-13

## 2024-11-12 RX ORDER — ATENOLOL 100 MG
50 TABLET ORAL DAILY
Refills: 0 | Status: DISCONTINUED | OUTPATIENT
Start: 2024-11-12 | End: 2024-11-13

## 2024-11-12 RX ORDER — FOLIC ACID 1 MG/1
1 TABLET ORAL DAILY
Refills: 0 | Status: DISCONTINUED | OUTPATIENT
Start: 2024-11-12 | End: 2024-11-13

## 2024-11-12 RX ORDER — ASPIRIN/MAG CARB/ALUMINUM AMIN 325 MG
81 TABLET ORAL DAILY
Refills: 0 | Status: DISCONTINUED | OUTPATIENT
Start: 2024-11-12 | End: 2024-11-13

## 2024-11-12 RX ORDER — ROSUVASTATIN CALCIUM 10 MG
10 TABLET ORAL AT BEDTIME
Refills: 0 | Status: DISCONTINUED | OUTPATIENT
Start: 2024-11-12 | End: 2024-11-13

## 2024-11-12 RX ORDER — CYANOCOBALAMIN (VITAMIN B-12) 1000MCG/15
1 LIQUID (ML) ORAL
Refills: 0 | DISCHARGE

## 2024-11-12 RX ORDER — ONDANSETRON HYDROCHLORIDE 2 MG/ML
4 INJECTION, SOLUTION INTRAMUSCULAR; INTRAVENOUS ONCE
Refills: 0 | Status: COMPLETED | OUTPATIENT
Start: 2024-11-12 | End: 2024-11-12

## 2024-11-12 RX ORDER — AMLODIPINE BESYLATE 10 MG
10 TABLET ORAL DAILY
Refills: 0 | Status: DISCONTINUED | OUTPATIENT
Start: 2024-11-12 | End: 2024-11-13

## 2024-11-12 RX ORDER — FINASTERIDE 5 MG/1
5 TABLET, FILM COATED ORAL DAILY
Refills: 0 | Status: DISCONTINUED | OUTPATIENT
Start: 2024-11-12 | End: 2024-11-13

## 2024-11-12 RX ORDER — NOREPINEPHRINE BITARTRATE 1 MG/ML
0.05 INJECTION, SOLUTION, CONCENTRATE INTRAVENOUS
Qty: 8 | Refills: 0 | Status: DISCONTINUED | OUTPATIENT
Start: 2024-11-12 | End: 2024-11-13

## 2024-11-12 RX ORDER — LOSARTAN POTASSIUM 25 MG/1
100 TABLET ORAL DAILY
Refills: 0 | Status: DISCONTINUED | OUTPATIENT
Start: 2024-11-12 | End: 2024-11-13

## 2024-11-12 RX ADMIN — ONDANSETRON HYDROCHLORIDE 4 MILLIGRAM(S): 2 INJECTION, SOLUTION INTRAMUSCULAR; INTRAVENOUS at 19:50

## 2024-11-12 RX ADMIN — ONDANSETRON HYDROCHLORIDE 4 MILLIGRAM(S): 2 INJECTION, SOLUTION INTRAMUSCULAR; INTRAVENOUS at 16:05

## 2024-11-12 RX ADMIN — SODIUM CHLORIDE 75 MILLILITER(S): 9 INJECTION, SOLUTION INTRAMUSCULAR; INTRAVENOUS; SUBCUTANEOUS at 09:45

## 2024-11-12 RX ADMIN — ONDANSETRON HYDROCHLORIDE 4 MILLIGRAM(S): 2 INJECTION, SOLUTION INTRAMUSCULAR; INTRAVENOUS at 18:29

## 2024-11-12 RX ADMIN — Medication 10 MILLIGRAM(S): at 22:14

## 2024-11-12 RX ADMIN — SODIUM CHLORIDE 750 MILLILITER(S): 9 INJECTION, SOLUTION INTRAMUSCULAR; INTRAVENOUS; SUBCUTANEOUS at 09:45

## 2024-11-12 NOTE — DISCHARGE NOTE PROVIDER - CARE PROVIDER_API CALL
Steve Camara.  Cardiology  [address] [place] [id number]  Phone: [phone]  Fax: [phone]  Follow Up Time: 2 weeks

## 2024-11-12 NOTE — H&P CARDIOLOGY - HISTORY OF PRESENT ILLNESS
This is an 78 year old  male with no known implantable devices noted with  PMHX of CAD, CABG 5v (1996 LIMA-LAD, SVG-D1, SVG-RPDA), with stent placement to native prox/mid RCA 2018 for occluded SVG-RPDA, HTN, HLD, ALICIA on CPAP, and BPH who presented to cardiology Dr. Steve Camara . PT seen by Dr. De La Torre, for evaluation of abnormal stress test .   Denies SOB/CHRISTIAN, dizziness, diaphoresis, palpitations, nausea, vomiting, peripheral edema, recent weight gain, or syncope.   Pt. presents for further evaluation and cardiac cath today with Dr. De La Torre . Currently Asymptomatic no CP no sob no palpitations noted.   < from: Cardiac Catheterization (05.12.22 @ 10:05) >  Conclusions:   Patent LIMA-LAD and SVG-D1. Native RCA severe ostial ISR (previous  stent protruding into aorta). This was treated with    3.5mm Shockwave IVL + 4.0mm NC POBA under IVUS guidance.   Recommendations:   Aggressive medical therapy     Procedure Narrative:     < end of copied text >  < from: Cardiac Cath Lab - Adult (06.01.18 @ 14:06) >  VENTRICLES: No left ventriculogram was performed.  CORONARY VESSELS: The coronary circulation is right dominant.  LM:   --  Mid left main: There was a 50 % stenosis.  LAD:   --  Proximal LAD: There was a tubular 80 % stenosis. There was JING  grade 3 flow through the vessel (brisk flow).  --  Mid LAD: There was a 100 % stenosis.  CX:   --  Proximal circumflex: There was a 100 % stenosis.  RCA:   --  Proximal RCA: There was a diffuse 95 % stenosis. There was JING  grade 2 flow through the vessel (partial perfusion). This is a likely  culprit for the patient's clinical presentation. An intervention was  performed.  --  Distal RCA: There was a diffuse 95 % stenosis. There was JING grade 2  flow through the vessel (partial perfusion). This is a likely culprit for  the patient's clinical presentation.  GRAFTS:   --  Graft to the mid LAD: The graft was a normal sized LIMA.  Distal vessel angiography showed minor luminal irregularities.  --  Graft to the 1st diagonal: The graft was a normal sized saphenous vein  graft from the aorta. Graft angiography showed minor luminal  irregularities. Distal vessel angiography showed minor luminal  irregularities.  --  Graft to the RPDA:The graft was a saphenous vein graft from the aorta.  There was a 100 % stenosis at the proximal anastomosis.  COMPLICATIONS: There were no complications.  DIAGNOSTIC RECOMMENDATIONS:  1. Successful PCI to the pRCA (3.0mm Resolute Gay DAVID) and mRCA (2.5mm  Resolute John DAVID) in the setting of angina.  2. DAPT.  INTERVENTIONAL RECOMMENDATIONS:  1. Successful PCI to the pRCA (3.0mm Resolute Gay DAVID) and mRCA (2.5mm  Resolute John DAVID) in the setting of angina.  2. DAPT.    < end of copied text >

## 2024-11-12 NOTE — PATIENT PROFILE ADULT - FUNCTIONAL ASSESSMENT - BASIC MOBILITY 3.
"Subjective:       Patient ID: Alejandro Mancia is a 25 y.o. male.    Chief Complaint: Medication Problem and Erectile Dysfunction      History of Present Illness    Patient presents today to discuss anxiety and possibly getting back on medication. He presents with complaints of anxiety manifesting as shortness of breath, chest discomfort, abdominal pain, and headaches. He reports these symptoms frequently and with significant severity, describing the chest discomfort as "very uncomfortable feeling" even when lying down. He feels "crazy anxious" lately, prompting his desire to manage the anxiety before considering treatment for attention deficit issues. He reports undergoing an echocardiogram with Dr. Tavarez in January 2023 which was normal. He denies any current severe cardiac symptoms but expresses concern about experiencing chest heaviness, shortness of breath, abdominal pain, and headaches which he attributes to anxiety rather than a cardiac issue given his young age of 25 years and previously normal cardiac workup. He acknowledges the need to address his high blood pressure, stating he would be open to undergoing a cardiac evaluation for reassurance and to help manage his anxiety surrounding his symptoms. He presents with a history of erectile dysfunction while previously taking Lexapro. He reports currently experiencing erectile dysfunction, which he has had in the past only when taking Lexapro. He denies any other urological symptoms or seeing a urologist previously. He denies any other previous medication trials for anxiety or depression. He has been seeing a therapist and has attended two sessions in the past couple of months to address lifestyle factors contributing to his medical condition. He reports benefiting from the therapy sessions thus far. Patient is otherwise without concerns today.      Review of Systems   Constitutional:  Negative for fever.   HENT:  Negative for ear pain, rhinorrhea and sore " throat.    Respiratory:  Positive for shortness of breath. Negative for wheezing.    Cardiovascular:  Positive for chest pain. Negative for leg swelling.   Gastrointestinal:  Negative for abdominal pain and vomiting.   Musculoskeletal:  Negative for neck pain.   Skin:  Negative for rash.   Neurological:  Negative for headaches.         Objective:      Physical Exam  Vitals reviewed.   Constitutional:       General: He is not in acute distress.     Appearance: He is well-developed.   HENT:      Head: Normocephalic and atraumatic.   Eyes:      General: Lids are normal. No scleral icterus.     Extraocular Movements: Extraocular movements intact.      Conjunctiva/sclera: Conjunctivae normal.      Pupils: Pupils are equal, round, and reactive to light.   Pulmonary:      Effort: Pulmonary effort is normal.   Neurological:      Mental Status: He is alert and oriented to person, place, and time.      Cranial Nerves: No cranial nerve deficit.      Gait: Gait normal.   Psychiatric:         Mood and Affect: Mood and affect normal.         Assessment:       1. Anxiety        Plan:   1. Anxiety  -     busPIRone (BUSPAR) 5 MG Tab; Take 1 tablet (5 mg total) by mouth 2 (two) times daily.  Dispense: 60 tablet; Refill: 5     Patient presents with anxiety, shortness of breath, and erectile dysfunction   Anxiety likely contributing to physical symptoms including chest discomfort, given recent normal cardiac workup with Dr. Tavarez in January 2023   Will trial BuSpar for generalized anxiety disorder as SSRIs previously caused erectile dysfunction   If anxiety does not improve with BuSpar, will consider psychiatry referral   Blood pressure issues likely tied to anxiety. If anxiety improves, may not require medication for hypertension   Reviewed breathing exercise to help relax and get out of fight or flight response: breathe in for 4 counts, hold for 2 counts, breathe out for 8 counts.   Discussed how working with therapist can help with  coping strategies and reframing thoughts.   Notify if any problems with the medication.   Follow up in 1 month, either in person or virtually.    Patient expressed understanding and agreement with plan.    Visit today included increased complexity associated with the care of the episodic problem anxiety, which was addressed while instituting co-management of the longitudinal care of the patient due to the serious and/or complex managed problem(s) .    I have evaluated and discussed management associated with medical care services that serve as the continuing focal point for all needed health care services and/or with medical care services that are part of ongoing care related to my patient's single, serious condition or a complex condition(s).    I am providing ongoing care and I am the primary care provider for this patient, and they are being managed, monitored, and/or observed for their chronic conditions over time.     I have addressed their ongoing health maintenance requirements and needs for all health care services and reviewed co-management plans provided by specialty providers when available.    There are no preventive care reminders to display for this patient.      Follow up in about 1 month (around 8/9/2024), or if symptoms worsen or fail to improve, for EP/f/u anxiety with Dr. Melendez.    This note was generated with the assistance of ambient listening technology. Verbal consent was obtained by the patient and accompanying visitor(s) for the recording of patient appointment to facilitate this note. I attest to having reviewed and edited the generated note for accuracy, though some syntax or spelling errors may persist. Please contact the author of this note for any clarification.     4 = No assist / stand by assistance

## 2024-11-12 NOTE — DISCHARGE NOTE PROVIDER - HOSPITAL COURSE
78 year old  male with no known implantable devices noted with  PMHX of CAD, CABG 5v (1996 LIMA-LAD, SVG-D1, SVG-RPDA), with stent placement to native prox/mid RCA 2018 for occluded SVG-RPDA, HTN, HLD, ALICIA on CPAP, and BPH who presented to cardiology Dr. Steve Camara . PT seen by Dr. De La Torre, for evaluation of abnormal stress test . On 11/12 she underwent a cardiac catheterization with DAVID and CSI to the proximal LAD which was 90% via the right femoral artery. Post procedure she became unresponsive which did not improve with atropine or pressers. A stat transthoracic echocardiogram was performed which revealed a pericardial effusion. She was emergently taken back the the lab where she underwent a pericardiocentesis without complicaton. She returned to the [facility] hemodynamically stable without any further hypotension. 78 year old  male with no known implantable devices noted with  PMHX of CAD, CABG 5v (1996 LIMA-LAD, SVG-D1, SVG-RPDA), with stent placement to native prox/mid RCA 2018 for occluded SVG-RPDA, HTN, HLD, ALICIA on CPAP, and BPH who presented to cardiology Dr. Steve Camara . PT seen by Dr. De La Torre, for evaluation of abnormal stress test . On 11/12 she underwent a cardiac catheterization with DAVID and CSI to the proximal LAD which was 90% via the right femoral artery. Post procedure she became unresponsive which did not improve with atropine or pressers. A stat transthoracic echocardiogram was performed which revealed a pericardial effusion. She was emergently taken back the the lab where she underwent a pericardiocentesis without complication. She returned to the [facility] hemodynamically stable without any further hypotension. 11/13 drain removed in NAD, repeat echo stable.

## 2024-11-12 NOTE — ASU PATIENT PROFILE, ADULT - ALCOHOL USE HISTORY SINGLE SELECT
BCL in place removed today without complications. H/o RES OS. Decrease Ofloxacin BID OS. Strongly advised the importance of PF ATs Q1H OS routinely and AT Darius QHS OS. 2. RES OS w/ h/o PTK (August 2019) - (LLL plug in place)3. H/o Corneal Injury OS (Cable) with corneal membrane separation (March 2019)4. Peripheral Corneal scar OS - H/o Corneal Ulcer OS. Patient seen by Dr. Luzmaria Starks December 2019 who recommended observation without any surgical intervention. never

## 2024-11-12 NOTE — ASU DISCHARGE PLAN (ADULT/PEDIATRIC) - CARE PROVIDER_API CALL
Steve Camara.  Cardiology  [address] [place] [id number]  Phone: [phone]  Fax: [phone]  Follow Up Time: 2 weeks

## 2024-11-12 NOTE — ASU DISCHARGE PLAN (ADULT/PEDIATRIC) - NS MD DC FALL RISK RISK
For information on Fall & Injury Prevention, visit: [email or web address] OR  [email or web address] OR  https://www.cdc.gov/steadi/patient.html

## 2024-11-12 NOTE — DISCHARGE NOTE PROVIDER - NSRESEARCHGRANT_OVERRIDEREC_GEN_A_CORE
This is a surgical and/or non-medical patient. Dual antiplatelet therapy/This is a surgical and/or non-medical patient.

## 2024-11-12 NOTE — PATIENT PROFILE ADULT - STATED REASON FOR ADMISSION
[FreeTextEntry1] : 63-year-old female former smoker with DM, HTN, HLD, NICM (EF 31% by MUGA 07/2019) s/p SubQ ICD who presents for follow-up visit. \par \par 1.NICM s/p ICD: Patient currently is on metoprolol,  spironolactone, and Entresto\par -Patient is euvolemic on exam\par  \par 2.HTN:\par -Currently on max dose Entresto, metoprolol and spironolactone\par -Renal function mildly worsened when she was in hospital, will re-check chem 8  \par \par 3.HLD: Continue atorvastatin\par \par FUV 3 months chest pain

## 2024-11-12 NOTE — DISCHARGE NOTE PROVIDER - NSDCCPTREATMENT_GEN_ALL_CORE_FT
PRINCIPAL PROCEDURE  Procedure: Left heart cardiac cath  Findings and Treatment: DAVID to the proximal LAD      SECONDARY PROCEDURE  Procedure: Pericardiocentesis  Findings and Treatment: Pericardial drain     PRINCIPAL PROCEDURE  Procedure: Left heart cardiac cath  Findings and Treatment: S/p drug eluting stent (DAVID) to the proximal LAD  Continue meds as prescribed. Follow up with your cardiologist in 1-2 weeks post discharge.      SECONDARY PROCEDURE  Procedure: Pericardiocentesis  Findings and Treatment: 11/12 Pericardial drain inserted.    11/13 Repeat echo was stable.  Drain removed.

## 2024-11-12 NOTE — DISCHARGE NOTE PROVIDER - NSDCMRMEDTOKEN_GEN_ALL_CORE_FT
amLODIPine 10 mg oral tablet: 1 tab(s) orally once a day  aspirin 81 mg oral delayed release tablet: 1 tab(s) orally once a day  atenolol 50 mg oral tablet: 1 tab(s) orally once a day  Cardiac Rehab: 3 times a week for 12 weeks. This is a referral. Please follow OP Cardiologist for Script.  clopidogrel 75 mg oral tablet: 1 tab(s) orally once a day  cyanocobalamin: 1 tab(s) orally once a day  finasteride 5 mg oral tablet: 1 tab(s) orally once a day  folic acid 1 mg oral tablet: 1 tab(s) orally once a day  Rapaflo 8 mg oral capsule: 1 cap(s) orally once a day  rosuvastatin 5 mg oral tablet: 2 tab(s) orally once a day (at bedtime)  telmisartan 40 mg oral tablet: 1 tab(s) orally once a day   acetaminophen 325 mg oral tablet: 2 tab(s) orally every 6 hours As needed Moderate Pain (4 - 6)  amLODIPine 10 mg oral tablet: 1 tab(s) orally once a day  aspirin 81 mg oral delayed release tablet: 1 tab(s) orally once a day  atenolol 50 mg oral tablet: 1 tab(s) orally once a day  Cardiac Rehab: 3 times a week for 12 weeks. This is a referral. Please follow OP Cardiologist for Script.  clopidogrel 75 mg oral tablet: 1 tab(s) orally once a day  cyanocobalamin: 1 tab(s) orally once a day  finasteride 5 mg oral tablet: 1 tab(s) orally once a day  folic acid 1 mg oral tablet: 1 tab(s) orally once a day  Rapaflo 8 mg oral capsule: 1 cap(s) orally once a day  rosuvastatin 5 mg oral tablet: 2 tab(s) orally once a day (at bedtime)  telmisartan 40 mg oral tablet: 1 tab(s) orally once a day

## 2024-11-12 NOTE — DISCHARGE NOTE PROVIDER - NSDCCPCAREPLAN_GEN_ALL_CORE_FT
PRINCIPAL DISCHARGE DIAGNOSIS  Diagnosis: CAD (coronary artery disease)  Assessment and Plan of Treatment: You had an angioplasty.You may have also had a stent placed. Both of these were done to open narrowed or blocked coronary arteries, the blood vessels that supply blood to your heart. Complete recovery takes a week or less. Keep the area where the catheter was inserted dry for 24 to 48 hours.Walking short distances on a flat surface is OK. Limit going up and down stairs to around 2 times a day for the first 2 to 3 days.Don't do yard work, drive, squat, carry heavy objects, or play sports for at least 2 days, or until your health care provider tells you it is safe. Avoid sexual activity for 2 to 5 days. Ask your provider when it will be OK to start again.Don't take a bath or swim for the first week. You may take showers, but make sure the area where the catheter was inserted does not get wet for the first 24 to 48 hours.If your incision bleeds or swells up, lie down and put pressure on it for 30 minutes.   Eat a heart-healthy diet, exercise, stop smoking (if you smoke), and reduce stress to help lower your chances of having a blocked artery again. Your provider may give you medicine to help lower your cholesterol. Take the medicines exactly as your provider tells you. Do not stop taking them without talking with your provider first.Make sure you have a follow-up appointment scheduled with your heart care provider (cardiologist).  Contact your provider if:  There is bleeding at the catheter insertion site that does not stop when you apply pressure.  There is swelling at the catheter site.  Your leg or arm below where the catheter was inserted changes color, becomes cool to touch, or is numb.  The small incision for your catheter becomes red or painful, or yellow or green discharge is draining from it.  You have chest pain or shortness of breath that does not go away with rest.  Your pulse feels irregular -- very slow (fewer than 60 beats), or very fast (over 100 to 120 beats) a minute.  You have dizziness, fainting, or you are very tired.        SECONDARY DISCHARGE DIAGNOSES  Diagnosis: Pericardial effusion, acute  Assessment and Plan of Treatment: Pericardial effusion is a buildup of fluid in the pericardium. The pericardium is a 2-layer sac that surrounds the heart. The sac normally contains a small amount of clear fluid between its layers. This allows the heart to move smoothly against other organs in the chest as it beats. The fluid buildup puts pressure on your heart. This makes it difficult for your heart to pump. Fluid may collect slowly or quickly.   You may not have any symptoms, or you may have any of the following:  Chest pain  Cough  Feeling lightheaded or faint  Swelling of your legs and feet  Shortness of breath, especially when lying down  Trouble swallowing food

## 2024-11-12 NOTE — PATIENT PROFILE ADULT - DEAF OR HARD OF HEARING?
[de-identified] : 74 no

## 2024-11-12 NOTE — ASU DISCHARGE PLAN (ADULT/PEDIATRIC) - FINANCIAL ASSISTANCE
[facility] provides services at a reduced cost to those who are determined to be eligible through [facility]’s financial assistance program. Information regarding [facility]’s financial assistance program can be found by going to [email or web address] or by calling [phone].

## 2024-11-12 NOTE — PATIENT PROFILE ADULT - FALL HARM RISK - UNIVERSAL INTERVENTIONS
Bed in lowest position, wheels locked, appropriate side rails in place/Call bell, personal items and telephone in reach/Instruct patient to call for assistance before getting out of bed or chair/Non-slip footwear when patient is out of bed/[place] to call system/Physically safe environment - no spills, clutter or unnecessary equipment/Purposeful Proactive Rounding/Room/bathroom lighting operational, light cord in reach

## 2024-11-13 ENCOUNTER — TRANSCRIPTION ENCOUNTER (OUTPATIENT)
Age: 79
End: 2024-11-13

## 2024-11-13 ENCOUNTER — APPOINTMENT (OUTPATIENT)
Dept: CARDIOLOGY | Facility: CLINIC | Age: 79
End: 2024-11-13

## 2024-11-13 ENCOUNTER — RESULT REVIEW (OUTPATIENT)
Age: 79
End: 2024-11-13

## 2024-11-13 VITALS
RESPIRATION RATE: 17 BRPM | DIASTOLIC BLOOD PRESSURE: 59 MMHG | OXYGEN SATURATION: 94 % | TEMPERATURE: 99 F | SYSTOLIC BLOOD PRESSURE: 102 MMHG | HEART RATE: 69 BPM

## 2024-11-13 LAB
A1C WITH ESTIMATED AVERAGE GLUCOSE RESULT: 5.9 % — HIGH (ref 4–5.6)
ADD ON TEST-SPECIMEN IN LAB: SIGNIFICANT CHANGE UP
ANION GAP SERPL CALC-SCNC: 13 MMOL/L — SIGNIFICANT CHANGE UP (ref 5–17)
BUN SERPL-MCNC: 19 MG/DL — SIGNIFICANT CHANGE UP (ref 7–23)
CALCIUM SERPL-MCNC: 8.6 MG/DL — SIGNIFICANT CHANGE UP (ref 8.4–10.5)
CHLORIDE SERPL-SCNC: 108 MMOL/L — SIGNIFICANT CHANGE UP (ref 96–108)
CHOLEST SERPL-MCNC: 105 MG/DL — SIGNIFICANT CHANGE UP
CO2 SERPL-SCNC: 22 MMOL/L — SIGNIFICANT CHANGE UP (ref 22–31)
CREAT SERPL-MCNC: 1.03 MG/DL — SIGNIFICANT CHANGE UP (ref 0.5–1.3)
EGFR: 73 ML/MIN/1.73M2 — SIGNIFICANT CHANGE UP
ESTIMATED AVERAGE GLUCOSE: 123 MG/DL — HIGH (ref 68–114)
GLUCOSE SERPL-MCNC: 146 MG/DL — HIGH (ref 70–99)
HCT VFR BLD CALC: 42.4 % — SIGNIFICANT CHANGE UP (ref 39–50)
HDLC SERPL-MCNC: 36 MG/DL — LOW
HGB BLD-MCNC: 14.5 G/DL — SIGNIFICANT CHANGE UP (ref 13–17)
LIPID PNL WITH DIRECT LDL SERPL: 53 MG/DL — SIGNIFICANT CHANGE UP
MAGNESIUM SERPL-MCNC: 2.1 MG/DL — SIGNIFICANT CHANGE UP (ref 1.6–2.6)
MCHC RBC-ENTMCNC: 30.5 PG — SIGNIFICANT CHANGE UP (ref 27–34)
MCHC RBC-ENTMCNC: 34.2 G/DL — SIGNIFICANT CHANGE UP (ref 32–36)
MCV RBC AUTO: 89.1 FL — SIGNIFICANT CHANGE UP (ref 80–100)
NON HDL CHOLESTEROL: 68 MG/DL — SIGNIFICANT CHANGE UP
NRBC # BLD: 0 /100 WBCS — SIGNIFICANT CHANGE UP (ref 0–0)
PLATELET # BLD AUTO: 180 K/UL — SIGNIFICANT CHANGE UP (ref 150–400)
POTASSIUM SERPL-MCNC: 3.5 MMOL/L — SIGNIFICANT CHANGE UP (ref 3.5–5.3)
POTASSIUM SERPL-SCNC: 3.5 MMOL/L — SIGNIFICANT CHANGE UP (ref 3.5–5.3)
RBC # BLD: 4.76 M/UL — SIGNIFICANT CHANGE UP (ref 4.2–5.8)
RBC # FLD: 12.8 % — SIGNIFICANT CHANGE UP (ref 10.3–14.5)
SODIUM SERPL-SCNC: 143 MMOL/L — SIGNIFICANT CHANGE UP (ref 135–145)
TRIGL SERPL-MCNC: 77 MG/DL — SIGNIFICANT CHANGE UP
WBC # BLD: 10.16 K/UL — SIGNIFICANT CHANGE UP (ref 3.8–10.5)
WBC # FLD AUTO: 10.16 K/UL — SIGNIFICANT CHANGE UP (ref 3.8–10.5)

## 2024-11-13 PROCEDURE — 93306 TTE W/DOPPLER COMPLETE: CPT | Mod: 26

## 2024-11-13 PROCEDURE — 99233 SBSQ HOSP IP/OBS HIGH 50: CPT | Mod: FS

## 2024-11-13 RX ORDER — ACETAMINOPHEN 500 MG
650 TABLET ORAL EVERY 6 HOURS
Refills: 0 | Status: DISCONTINUED | OUTPATIENT
Start: 2024-11-13 | End: 2024-11-13

## 2024-11-13 RX ORDER — ACETAMINOPHEN 500 MG
2 TABLET ORAL
Qty: 0 | Refills: 0 | DISCHARGE
Start: 2024-11-13

## 2024-11-13 RX ORDER — ONDANSETRON HYDROCHLORIDE 2 MG/ML
4 INJECTION, SOLUTION INTRAMUSCULAR; INTRAVENOUS ONCE
Refills: 0 | Status: COMPLETED | OUTPATIENT
Start: 2024-11-13 | End: 2024-11-13

## 2024-11-13 RX ORDER — ROSUVASTATIN CALCIUM 10 MG
40 TABLET ORAL AT BEDTIME
Refills: 0 | Status: DISCONTINUED | OUTPATIENT
Start: 2024-11-13 | End: 2024-11-13

## 2024-11-13 RX ORDER — POTASSIUM CHLORIDE 10 MEQ
40 TABLET, EXTENDED RELEASE ORAL ONCE
Refills: 0 | Status: COMPLETED | OUTPATIENT
Start: 2024-11-13 | End: 2024-11-13

## 2024-11-13 RX ORDER — FIRST AID ANTIBIOTIC 500 [USP'U]/G
1 OINTMENT TOPICAL ONCE
Refills: 0 | Status: DISCONTINUED | OUTPATIENT
Start: 2024-11-13 | End: 2024-11-13

## 2024-11-13 RX ADMIN — Medication 50 MILLIGRAM(S): at 05:23

## 2024-11-13 RX ADMIN — Medication 40 MILLIEQUIVALENT(S): at 05:23

## 2024-11-13 RX ADMIN — ONDANSETRON HYDROCHLORIDE 4 MILLIGRAM(S): 2 INJECTION, SOLUTION INTRAMUSCULAR; INTRAVENOUS at 12:00

## 2024-11-13 RX ADMIN — Medication 81 MILLIGRAM(S): at 05:23

## 2024-11-13 RX ADMIN — Medication 650 MILLIGRAM(S): at 12:00

## 2024-11-13 RX ADMIN — CLOPIDOGREL 75 MILLIGRAM(S): 75 TABLET ORAL at 05:23

## 2024-11-13 NOTE — DISCHARGE NOTE NURSING/CASE MANAGEMENT/SOCIAL WORK - FINANCIAL ASSISTANCE
[facility] provides services at a reduced cost to those who are determined to be eligible through [facility]’s financial assistance program. Information regarding [facility]’s financial assistance program can be found by going to [email or web address] or by calling [phone]. detailed exam

## 2024-11-13 NOTE — PROGRESS NOTE ADULT - ASSESSMENT
78 year old  male with no known implantable devices noted with  PMHX of CAD, CABG 5v (1996 LIMA-LAD, SVG-D1, SVG-RPDA), with stent placement to native prox/mid RCA 2018 for occluded SVG-RPDA, HTN, HLD, ALICIA on CPAP, and BPH s/p PCI of the RCA c/b large effusion equiring pericardiocentesis    CAD  - s/p PCI of the native RCA  - R groin access site stable  - Continue ASA, plavix, rosuvastatin increased to 40  - Continue home atenolol and norvasc  - Fluids 75/hour for 6 hours    Pericardial effusion w/ clinical tamponade  - s/p pericardial drain, 100 cc bloody output  - Limited TTE in Am    BPH  - Continue proscar    Dispo  - Continue tele monitoring

## 2024-11-13 NOTE — PROGRESS NOTE ADULT - SUBJECTIVE AND OBJECTIVE BOX
78 year old  male with no known implantable devices noted with  PMHX of CAD, CABG 5v ( LIMA-LAD, SVG-D1, SVG-RPDA), with stent placement to native prox/mid RCA 2018 for occluded SVG-RPDA, HTN, HLD, ALICIA on CPAP, and BPH who presented to cardiology Dr. Steve Camara . PT seen by Dr. De La Torre, for evaluation of abnormal stress test .     Events: Following PCI pt became hypotensive and found to have a pericardial effusion. He returned to the lab for a pericardiocentesis     Medications:  amLODIPine   Tablet 10 milliGRAM(s) Oral daily  aspirin enteric coated 81 milliGRAM(s) Oral daily  atenolol  Tablet 50 milliGRAM(s) Oral daily  clopidogrel Tablet 75 milliGRAM(s) Oral daily  finasteride 5 milliGRAM(s) Oral daily  folic acid 1 milliGRAM(s) Oral daily  losartan 100 milliGRAM(s) Oral daily  norepinephrine Infusion 0.05 MICROgram(s)/kG/Min IV Continuous <Continuous>  rosuvastatin 10 milliGRAM(s) Oral at bedtime  sodium chloride 0.9%. 1000 milliLiter(s) IV Continuous <Continuous>  sodium chloride 0.9%. 1000 milliLiter(s) IV Continuous <Continuous>    Vitals:  T(C): 36.8 (24 @ 20:30), Max: 36.8 (24 @ 20:30)  HR: 77 (24 @ 23:00) (53 - 82)  BP: 117/65 (24 @ 23:00) (55/30 - 169/67)  BP(mean): 86 (24 @ 23:00) (38 - 109)  RR: 17 (24 @ 23:00) (16 - 18)  SpO2: 93% (24 @ 23:00) (92% - 99%)    Daily Height in cm: 170.18 (2024 08:49)    Daily Weight in k.5 (2024 08:49)  I&O's Summary    Physical Exam:  Appearance: Normal  Eyes: PERRL, EOMI  HENT: Normal oral [place], NC/AT  Cardiovascular: S1S2, RRR, No M/R/G, no JVD, No Lower extremity edema  Procedural Access Site: Midsternal drain C/D/I w/ bloody drainage  Respiratory: Clear to auscultation bilaterally  Gastrointestinal: Soft, Non tender, Normal Bowel Sounds  Musculoskeletal: No clubbing, No joint deformity   Neurologic: Non-focal  Lymphatic: No lymphadenopathy  Psychiatry: AAOx3, Mood & affect appropriate  Skin: No rashes, No ecchymoses, No cyanosis    11-12    143  |  105  |  15  ----------------------------<  117[H]  3.9   |  28  |  0.96    Ca    8.7      2024 08:25    ECG:    Cath: DAVID to native proximal RCA    Imaging:The left ventricle was not well visualized. Left ventricular endocardium is not well visualized; however, the left ventricular systolic function appears grossly normal.     Right Ventricle:  The right ventricle is not well visualized. Right ventricular systolic function is probably normal.     Pericardium:  Pre-procedure, a moderate to largepericardial effusion was noted. Injection of agitated saline confirmed proper location of drainage catheter in the pericardial space.. Post drainage, small residual pericardial effusion was noted measuring 1.09 cm near the right ventricle.    Interpretation of Telemetry: Sr 80

## 2024-11-13 NOTE — DISCHARGE NOTE NURSING/CASE MANAGEMENT/SOCIAL WORK - PATIENT PORTAL LINK FT
You can access the FollowMyHealth Patient Portal offered by [facility] by registering at the following website: [email or web address]. By joining [employer]’s FollowMyHealth portal, you will also be able to view your health information using other applications (apps) compatible with our system.

## 2024-11-13 NOTE — CHART NOTE - NSCHARTNOTEFT_GEN_A_CORE
Cardiac Rehab (Post PCI):              *Education on benefits of Cardiac Rehab provided to patient: Yes         *Referral and Prescription Given for Cardiac Rehab : Yes         *Pt given list of locations & instructed to contact their insurance company to review list of participating providers: Yes         *Pt instructed to bring Cardiac Rehab prescription with them to Cardiology Follow up appointment for assistance with enrollment: Yes         *Pt discharged with copies detail cardiovascular history, medications, testing/treatments: Yes
78 year old  male with PMHX of CAD, CABG 5v (1996 LIMA-LAD, SVG-D1, SVG-RPDA), with stent placement to native prox/mid RCA 2018 for occluded SVG-RPDA, HTN, HLD, ALICIA on CPAP, and BPH who presented to cardiology Dr. Steve Camara . PT seen by Dr. De La Torre, for evaluation of abnormal stress test . Denies SOB/CHRISTIAN, dizziness, diaphoresis, palpitations, nausea, vomiting, peripheral edema, recent weight gain, or syncope.    11/12 DAVID/CSI X1 of prox LAD (90%) Cont ASA/Plavix   RFA 1645    Pt with low BP 79/51 @ 1600 hrs  Pt c/o dizziness  IVF bolus started 500 ml  Atropine and Neosynephrine x 1 given  TTE stat done at bedside showed pericardial effusion  Dr De La Torre at bedside.  Pt was taken back to the Cath lab.
ROSENDA GATES    Patient referred to Cardiac Rehab s/p PCI to prox LAD DESx1, atherectomy.            *Education on benefits of Cardiac Rehab provided to patient: Yes         *Referral and Prescription Given for Cardiac Rehab : Yes         *Pt given list of locations & instructed to contact their insurance company to review list of participating providers: Yes         *Pt instructed to bring Cardiac Rehab prescription with them to Cardiology Follow up appointment for assistance with enrollment: Yes         *Pt discharged with copies detail cardiovascular history, medications, testing/treatments: Yes.    11-13-24 @ 13:21
Removal of Femoral Sheath    Pulses in the right/left lower extremity are palpable/audible by doppler/absent.   The patient was placed in the supine position. The insertion site was identified and the sutures were removed per protocol.    The ___6_ Thai femoral sheath was then removed.   Direct pressure was applied for  __20____ minutes.   Complications: None, VSS, Good Hemostasis.     Monitoring of the right/left groin and both lower extremities including neuro-vascular checks and vital signs every 15 minutes x 4, then every 30 minutes x 2, then every 1 hour was ordered.    Discharge Instruction discussed with patient: ASA, Plavix/Brilinta, statin, diet, activities, access site care, follow up care, reportable signs and symptoms.     A/P   11/12 DAVID/CSI X1 of prox LAD (90%) ASA/Plavix   11/12 Pericardiocentesis, drain in chest wall     Plan: continue to monitor, Continue ASA, Plavix, Statin,

## 2024-11-25 LAB — HBA1C MFR BLD HPLC: 5.7

## 2024-11-26 PROCEDURE — C1729: CPT

## 2024-11-26 PROCEDURE — 93308 TTE F-UP OR LMTD: CPT

## 2024-11-26 PROCEDURE — 83735 ASSAY OF MAGNESIUM: CPT

## 2024-11-26 PROCEDURE — C8929: CPT

## 2024-11-26 PROCEDURE — C1887: CPT

## 2024-11-26 PROCEDURE — 86850 RBC ANTIBODY SCREEN: CPT

## 2024-11-26 PROCEDURE — 36415 COLL VENOUS BLD VENIPUNCTURE: CPT

## 2024-11-26 PROCEDURE — 83036 HEMOGLOBIN GLYCOSYLATED A1C: CPT

## 2024-11-26 PROCEDURE — 86900 BLOOD TYPING SEROLOGIC ABO: CPT

## 2024-11-26 PROCEDURE — 85027 COMPLETE CBC AUTOMATED: CPT

## 2024-11-26 PROCEDURE — 93005 ELECTROCARDIOGRAM TRACING: CPT

## 2024-11-26 PROCEDURE — 80061 LIPID PANEL: CPT

## 2024-11-26 PROCEDURE — 84484 ASSAY OF TROPONIN QUANT: CPT

## 2024-11-26 PROCEDURE — 33016 PERICARDIOCENTESIS W/IMAGING: CPT

## 2024-11-26 PROCEDURE — 80048 BASIC METABOLIC PNL TOTAL CA: CPT

## 2024-11-26 PROCEDURE — C1894: CPT

## 2024-11-26 PROCEDURE — C1769: CPT

## 2024-11-26 PROCEDURE — 86901 BLOOD TYPING SEROLOGIC RH(D): CPT

## 2024-11-26 PROCEDURE — 93454 CORONARY ARTERY ANGIO S&I: CPT | Mod: 59

## 2024-12-03 ENCOUNTER — APPOINTMENT (OUTPATIENT)
Dept: CARDIOLOGY | Facility: CLINIC | Age: 79
End: 2024-12-03
Payer: MEDICARE

## 2024-12-03 ENCOUNTER — NON-APPOINTMENT (OUTPATIENT)
Age: 79
End: 2024-12-03

## 2024-12-03 VITALS
SYSTOLIC BLOOD PRESSURE: 108 MMHG | HEART RATE: 66 BPM | WEIGHT: 173 LBS | HEIGHT: 67 IN | DIASTOLIC BLOOD PRESSURE: 60 MMHG | OXYGEN SATURATION: 99 % | BODY MASS INDEX: 27.15 KG/M2

## 2024-12-03 DIAGNOSIS — I70.0 ATHEROSCLEROSIS OF AORTA: ICD-10-CM

## 2024-12-03 PROBLEM — I31.9 PERICARDIAL DISEASE: Status: ACTIVE | Noted: 2024-12-03

## 2024-12-03 PROBLEM — R07.89 ATYPICAL CHEST PAIN: Status: ACTIVE | Noted: 2023-01-17

## 2024-12-03 PROCEDURE — 99215 OFFICE O/P EST HI 40 MIN: CPT

## 2024-12-03 PROCEDURE — 93000 ELECTROCARDIOGRAM COMPLETE: CPT

## 2024-12-09 ENCOUNTER — APPOINTMENT (OUTPATIENT)
Dept: CARDIOLOGY | Facility: CLINIC | Age: 79
End: 2024-12-09
Payer: MEDICARE

## 2024-12-09 PROCEDURE — 93306 TTE W/DOPPLER COMPLETE: CPT

## 2024-12-09 PROCEDURE — 76376 3D RENDER W/INTRP POSTPROCES: CPT

## 2024-12-10 ENCOUNTER — APPOINTMENT (OUTPATIENT)
Dept: CARDIOLOGY | Facility: CLINIC | Age: 79
End: 2024-12-10
Payer: MEDICARE

## 2024-12-10 PROCEDURE — 93015 CV STRESS TEST SUPVJ I&R: CPT

## 2024-12-13 ENCOUNTER — APPOINTMENT (OUTPATIENT)
Dept: CARDIOLOGY | Facility: CLINIC | Age: 79
End: 2024-12-13
Payer: MEDICARE

## 2024-12-13 VITALS — HEART RATE: 78 BPM | DIASTOLIC BLOOD PRESSURE: 58 MMHG | OXYGEN SATURATION: 98 % | SYSTOLIC BLOOD PRESSURE: 100 MMHG

## 2024-12-13 DIAGNOSIS — R94.39 ABNORMAL RESULT OF OTHER CARDIOVASCULAR FUNCTION STUDY: ICD-10-CM

## 2024-12-13 DIAGNOSIS — R94.31 ABNORMAL ELECTROCARDIOGRAM [ECG] [EKG]: ICD-10-CM

## 2024-12-13 DIAGNOSIS — R07.89 OTHER CHEST PAIN: ICD-10-CM

## 2024-12-13 DIAGNOSIS — I25.10 ATHEROSCLEROTIC HEART DISEASE OF NATIVE CORONARY ARTERY W/OUT ANGINA PECTORIS: ICD-10-CM

## 2024-12-13 DIAGNOSIS — I71.20 THORACIC AORTIC ANEURYSM, WITHOUT RUPTURE, UNSPECIFIED: ICD-10-CM

## 2024-12-13 DIAGNOSIS — I31.9 DISEASE OF PERICARDIUM, UNSPECIFIED: ICD-10-CM

## 2024-12-13 DIAGNOSIS — E78.5 HYPERLIPIDEMIA, UNSPECIFIED: ICD-10-CM

## 2024-12-13 DIAGNOSIS — I10 ESSENTIAL (PRIMARY) HYPERTENSION: ICD-10-CM

## 2024-12-13 PROCEDURE — 99214 OFFICE O/P EST MOD 30 MIN: CPT

## 2025-01-04 ENCOUNTER — NON-APPOINTMENT (OUTPATIENT)
Age: 80
End: 2025-01-04

## 2025-03-05 ENCOUNTER — APPOINTMENT (OUTPATIENT)
Dept: CARDIOLOGY | Facility: CLINIC | Age: 80
End: 2025-03-05
Payer: MEDICARE

## 2025-03-05 VITALS
HEART RATE: 64 BPM | WEIGHT: 173 LBS | OXYGEN SATURATION: 98 % | DIASTOLIC BLOOD PRESSURE: 60 MMHG | SYSTOLIC BLOOD PRESSURE: 110 MMHG | BODY MASS INDEX: 27.1 KG/M2

## 2025-03-05 DIAGNOSIS — R94.39 ABNORMAL RESULT OF OTHER CARDIOVASCULAR FUNCTION STUDY: ICD-10-CM

## 2025-03-05 DIAGNOSIS — I31.9 DISEASE OF PERICARDIUM, UNSPECIFIED: ICD-10-CM

## 2025-03-05 DIAGNOSIS — I70.0 ATHEROSCLEROSIS OF AORTA: ICD-10-CM

## 2025-03-05 DIAGNOSIS — I25.10 ATHEROSCLEROTIC HEART DISEASE OF NATIVE CORONARY ARTERY W/OUT ANGINA PECTORIS: ICD-10-CM

## 2025-03-05 DIAGNOSIS — E78.5 HYPERLIPIDEMIA, UNSPECIFIED: ICD-10-CM

## 2025-03-05 DIAGNOSIS — R94.31 ABNORMAL ELECTROCARDIOGRAM [ECG] [EKG]: ICD-10-CM

## 2025-03-05 DIAGNOSIS — I71.20 THORACIC AORTIC ANEURYSM, WITHOUT RUPTURE, UNSPECIFIED: ICD-10-CM

## 2025-03-05 DIAGNOSIS — I10 ESSENTIAL (PRIMARY) HYPERTENSION: ICD-10-CM

## 2025-03-05 DIAGNOSIS — R07.89 OTHER CHEST PAIN: ICD-10-CM

## 2025-03-05 PROCEDURE — 99214 OFFICE O/P EST MOD 30 MIN: CPT

## 2025-06-13 ENCOUNTER — APPOINTMENT (OUTPATIENT)
Dept: CARDIOLOGY | Facility: CLINIC | Age: 80
End: 2025-06-13
Payer: MEDICARE

## 2025-06-13 VITALS
HEART RATE: 60 BPM | OXYGEN SATURATION: 98 % | BODY MASS INDEX: 27.1 KG/M2 | WEIGHT: 173 LBS | DIASTOLIC BLOOD PRESSURE: 64 MMHG | SYSTOLIC BLOOD PRESSURE: 110 MMHG

## 2025-06-13 PROCEDURE — 99214 OFFICE O/P EST MOD 30 MIN: CPT

## 2025-06-13 PROCEDURE — 93306 TTE W/DOPPLER COMPLETE: CPT
